# Patient Record
Sex: FEMALE | Race: ASIAN | NOT HISPANIC OR LATINO | URBAN - METROPOLITAN AREA
[De-identification: names, ages, dates, MRNs, and addresses within clinical notes are randomized per-mention and may not be internally consistent; named-entity substitution may affect disease eponyms.]

---

## 2024-05-02 ENCOUNTER — INPATIENT (INPATIENT)
Facility: HOSPITAL | Age: 24
LOS: 7 days | Discharge: HOME | End: 2024-05-10
Attending: STUDENT IN AN ORGANIZED HEALTH CARE EDUCATION/TRAINING PROGRAM | Admitting: STUDENT IN AN ORGANIZED HEALTH CARE EDUCATION/TRAINING PROGRAM
Payer: COMMERCIAL

## 2024-05-02 VITALS
OXYGEN SATURATION: 100 % | TEMPERATURE: 98 F | SYSTOLIC BLOOD PRESSURE: 120 MMHG | RESPIRATION RATE: 18 BRPM | DIASTOLIC BLOOD PRESSURE: 83 MMHG | HEART RATE: 65 BPM

## 2024-05-02 DIAGNOSIS — F32.A DEPRESSION, UNSPECIFIED: ICD-10-CM

## 2024-05-02 DIAGNOSIS — F41.9 ANXIETY DISORDER, UNSPECIFIED: ICD-10-CM

## 2024-05-02 DIAGNOSIS — T14.91XA SUICIDE ATTEMPT, INITIAL ENCOUNTER: ICD-10-CM

## 2024-05-02 LAB
ALBUMIN SERPL ELPH-MCNC: 4.7 G/DL — SIGNIFICANT CHANGE UP (ref 3.3–5)
ALP SERPL-CCNC: 55 U/L — SIGNIFICANT CHANGE UP (ref 40–120)
ALT FLD-CCNC: 9 U/L — SIGNIFICANT CHANGE UP (ref 4–33)
AMPHET UR-MCNC: NEGATIVE — SIGNIFICANT CHANGE UP
ANION GAP SERPL CALC-SCNC: 15 MMOL/L — HIGH (ref 7–14)
APAP SERPL-MCNC: <10 UG/ML — LOW (ref 15–25)
APPEARANCE UR: CLEAR — SIGNIFICANT CHANGE UP
AST SERPL-CCNC: 11 U/L — SIGNIFICANT CHANGE UP (ref 4–32)
BARBITURATES UR SCN-MCNC: NEGATIVE — SIGNIFICANT CHANGE UP
BASOPHILS # BLD AUTO: 0.04 K/UL — SIGNIFICANT CHANGE UP (ref 0–0.2)
BASOPHILS NFR BLD AUTO: 0.6 % — SIGNIFICANT CHANGE UP (ref 0–2)
BENZODIAZ UR-MCNC: NEGATIVE — SIGNIFICANT CHANGE UP
BILIRUB SERPL-MCNC: 0.6 MG/DL — SIGNIFICANT CHANGE UP (ref 0.2–1.2)
BILIRUB UR-MCNC: NEGATIVE — SIGNIFICANT CHANGE UP
BUN SERPL-MCNC: 8 MG/DL — SIGNIFICANT CHANGE UP (ref 7–23)
CALCIUM SERPL-MCNC: 9 MG/DL — SIGNIFICANT CHANGE UP (ref 8.4–10.5)
CHLORIDE SERPL-SCNC: 104 MMOL/L — SIGNIFICANT CHANGE UP (ref 98–107)
CO2 SERPL-SCNC: 22 MMOL/L — SIGNIFICANT CHANGE UP (ref 22–31)
COCAINE METAB.OTHER UR-MCNC: NEGATIVE — SIGNIFICANT CHANGE UP
COLOR SPEC: YELLOW — SIGNIFICANT CHANGE UP
CREAT SERPL-MCNC: 0.5 MG/DL — SIGNIFICANT CHANGE UP (ref 0.5–1.3)
CREATININE URINE RESULT, DAU: 17 MG/DL — SIGNIFICANT CHANGE UP
DIFF PNL FLD: NEGATIVE — SIGNIFICANT CHANGE UP
EGFR: 136 ML/MIN/1.73M2 — SIGNIFICANT CHANGE UP
EOSINOPHIL # BLD AUTO: 0.06 K/UL — SIGNIFICANT CHANGE UP (ref 0–0.5)
EOSINOPHIL NFR BLD AUTO: 0.9 % — SIGNIFICANT CHANGE UP (ref 0–6)
ETHANOL SERPL-MCNC: <10 MG/DL — SIGNIFICANT CHANGE UP
GLUCOSE SERPL-MCNC: 77 MG/DL — SIGNIFICANT CHANGE UP (ref 70–99)
GLUCOSE UR QL: NEGATIVE MG/DL — SIGNIFICANT CHANGE UP
HCG SERPL-ACNC: <1 MIU/ML — SIGNIFICANT CHANGE UP
HCT VFR BLD CALC: 43.7 % — SIGNIFICANT CHANGE UP (ref 34.5–45)
HGB BLD-MCNC: 14.2 G/DL — SIGNIFICANT CHANGE UP (ref 11.5–15.5)
IANC: 4.49 K/UL — SIGNIFICANT CHANGE UP (ref 1.8–7.4)
IMM GRANULOCYTES NFR BLD AUTO: 0.3 % — SIGNIFICANT CHANGE UP (ref 0–0.9)
KETONES UR-MCNC: ABNORMAL MG/DL
LEUKOCYTE ESTERASE UR-ACNC: NEGATIVE — SIGNIFICANT CHANGE UP
LYMPHOCYTES # BLD AUTO: 1.8 K/UL — SIGNIFICANT CHANGE UP (ref 1–3.3)
LYMPHOCYTES # BLD AUTO: 26.2 % — SIGNIFICANT CHANGE UP (ref 13–44)
MCHC RBC-ENTMCNC: 30.1 PG — SIGNIFICANT CHANGE UP (ref 27–34)
MCHC RBC-ENTMCNC: 32.5 GM/DL — SIGNIFICANT CHANGE UP (ref 32–36)
MCV RBC AUTO: 92.8 FL — SIGNIFICANT CHANGE UP (ref 80–100)
METHADONE UR-MCNC: NEGATIVE — SIGNIFICANT CHANGE UP
MONOCYTES # BLD AUTO: 0.47 K/UL — SIGNIFICANT CHANGE UP (ref 0–0.9)
MONOCYTES NFR BLD AUTO: 6.8 % — SIGNIFICANT CHANGE UP (ref 2–14)
NEUTROPHILS # BLD AUTO: 4.49 K/UL — SIGNIFICANT CHANGE UP (ref 1.8–7.4)
NEUTROPHILS NFR BLD AUTO: 65.2 % — SIGNIFICANT CHANGE UP (ref 43–77)
NITRITE UR-MCNC: NEGATIVE — SIGNIFICANT CHANGE UP
NRBC # BLD: 0 /100 WBCS — SIGNIFICANT CHANGE UP (ref 0–0)
NRBC # FLD: 0 K/UL — SIGNIFICANT CHANGE UP (ref 0–0)
OPIATES UR-MCNC: NEGATIVE — SIGNIFICANT CHANGE UP
OXYCODONE UR-MCNC: NEGATIVE — SIGNIFICANT CHANGE UP
PCP SPEC-MCNC: SIGNIFICANT CHANGE UP
PCP UR-MCNC: NEGATIVE — SIGNIFICANT CHANGE UP
PH UR: 6.5 — SIGNIFICANT CHANGE UP (ref 5–8)
PLATELET # BLD AUTO: 335 K/UL — SIGNIFICANT CHANGE UP (ref 150–400)
POTASSIUM SERPL-MCNC: 3.9 MMOL/L — SIGNIFICANT CHANGE UP (ref 3.5–5.3)
POTASSIUM SERPL-SCNC: 3.9 MMOL/L — SIGNIFICANT CHANGE UP (ref 3.5–5.3)
PROT SERPL-MCNC: 8 G/DL — SIGNIFICANT CHANGE UP (ref 6–8.3)
PROT UR-MCNC: NEGATIVE MG/DL — SIGNIFICANT CHANGE UP
RBC # BLD: 4.71 M/UL — SIGNIFICANT CHANGE UP (ref 3.8–5.2)
RBC # FLD: 12.2 % — SIGNIFICANT CHANGE UP (ref 10.3–14.5)
SALICYLATES SERPL-MCNC: <0.3 MG/DL — LOW (ref 15–30)
SARS-COV-2 RNA SPEC QL NAA+PROBE: SIGNIFICANT CHANGE UP
SODIUM SERPL-SCNC: 141 MMOL/L — SIGNIFICANT CHANGE UP (ref 135–145)
SP GR SPEC: 1 — SIGNIFICANT CHANGE UP (ref 1–1.03)
THC UR QL: NEGATIVE — SIGNIFICANT CHANGE UP
TOXICOLOGY SCREEN, DRUGS OF ABUSE, SERUM RESULT: SIGNIFICANT CHANGE UP
TSH SERPL-MCNC: 1.02 UIU/ML — SIGNIFICANT CHANGE UP (ref 0.27–4.2)
UROBILINOGEN FLD QL: 0.2 MG/DL — SIGNIFICANT CHANGE UP (ref 0.2–1)
WBC # BLD: 6.88 K/UL — SIGNIFICANT CHANGE UP (ref 3.8–10.5)
WBC # FLD AUTO: 6.88 K/UL — SIGNIFICANT CHANGE UP (ref 3.8–10.5)

## 2024-05-02 PROCEDURE — 99285 EMERGENCY DEPT VISIT HI MDM: CPT

## 2024-05-02 RX ORDER — HYDROXYZINE HCL 10 MG
25 TABLET ORAL EVERY 6 HOURS
Refills: 0 | Status: DISCONTINUED | OUTPATIENT
Start: 2024-05-02 | End: 2024-05-10

## 2024-05-02 RX ADMIN — Medication 1 MILLIGRAM(S): at 21:06

## 2024-05-02 RX ADMIN — Medication 25 MILLIGRAM(S): at 21:06
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
## 2024-05-02 NOTE — ED BEHAVIORAL HEALTH ASSESSMENT NOTE - NSSUICPROTFACT_PSY_ALL_CORE
Responsibility to children, family, or others/Identifies reasons for living/Engaged in work or school/Taoism beliefs

## 2024-05-02 NOTE — BH PATIENT PROFILE - NSICDXPASTMEDICALHX_GEN_ALL_CORE_FT
Pt ambulated into er with grandson. Pt reports she accidentally stepped on her cat last night and got bit on her right lower leg/ankle. Pt reports today the area is more red and swollen.   PAST MEDICAL HISTORY:  No pertinent past medical history
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
## 2024-05-02 NOTE — ED ADULT NURSE NOTE - OBJECTIVE STATEMENT
Received patient to [facility] from [facility] after she was brought from a therapist office. Patient told them that she was suicidal. Patient also states her family is her trigger/problem, She has attempted suicide  in the past.  Last week she tried to hang herself but the rope was not strong enough. She has been depressed for two months and was suicidal  last week. Patient evaluated by medical provider and labs drawn and sent. Patient being evaluated by psychiatrist at the present time. Waiting for results and disposition.  JIA Villareal

## 2024-05-02 NOTE — ED PROVIDER NOTE - CPE EDP NEURO NORM
----- Message from Abiola Nelson NP sent at 1/21/2022 12:04 PM CST -----  Please call patient with results.  Normal CBC and thyroid level.     Normal blood sugar, kidney function, and liver panel. Rest of CMP okay.     Cholesterol, LDL and triglycerides higher, HDL lower compared to eight months ago Overall cholesterol higher and his and his 10-year risk of heart disease or stroke is 8.5%. On the basis of your age and calculated risk for heart disease or stroke over 7.5%, the ACC/AHA guidelines suggest you should be on a moderate to high intensity statin.  REC: Continue with healthy diet (avoiding fats, carbohydrates and sugars) and exercising as tolerated. See if patient willing to start on statin Atorvastatin 10 mg daily. Recheck FLP/CMP if patient is agreeable. If only continues diet, weight loss, and exercise may recheck FLP in 4-6 months otherwise.     Otherwise continue with present management   normal...

## 2024-05-02 NOTE — ED ADULT TRIAGE NOTE - CHIEF COMPLAINT QUOTE
Brought in from outpatient mental health center after telling them she attempted to take her own life by hanging last week. Patient denies HI/VI/AH or current SI. Calm and cooperative in triage. Denies SOB, throat/neck pain, any other discomfort. No visible katarzyna on neck. PA Allison called. Patient brought back to .
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
## 2024-05-02 NOTE — ED BEHAVIORAL HEALTH ASSESSMENT NOTE - NS ED BHA PLAN ADMIT TO PSYCHIATRY BH CONTACTED FT
attempted to update referring [facility] staff at [phone]:  answering service instructed provider to call back tomorrow for an update as "office is close"
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
## 2024-05-02 NOTE — ED PROVIDER NOTE - CLINICAL SUMMARY MEDICAL DECISION MAKING FREE TEXT BOX
22 y/o F with no pmh states depression for the past 2 weeks accompanied with suicide ideation for the past 2 weeks with an attempt to hang herself last week but unable to due to an issue with the rope. patient made herself an appt at the [facility] who sent her here via EMS for evaluation. Patient denies prior suicide attempts. States her stress factors and depression stem from issues with her family. Has a job and works daily in a restaurant. Denies f/c, headache, dizziness, visual changes, CP, SOB, abdominal pain, n/v/d, dysuria, weakness, numbness, tingling. Denies visual/auditory hallucinations. Denies drug use/ETOH. Admits to vaping.   Plan: Will draw psych labs, UA, EKG, tox screen, COVID, Psych Consult, medicate as needed,  admission
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
## 2024-05-02 NOTE — BH PATIENT PROFILE - STATED REASON FOR ADMISSION
"I just wanted to do a therapy but they sent me to [facility] because I told them I was being suicidal"
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
## 2024-05-02 NOTE — ED BEHAVIORAL HEALTH ASSESSMENT NOTE - PSYCHIATRIC ISSUES AND PLAN (INCLUDE STANDING AND PRN MEDICATION)
defer standing psychotropic to defer standing psychotropic to PRIMARY treatment team. PRNs: atarax 25mg PO q6Hrs for anxiety/ sleep disturbances.  PRNs: ativan 1mg PO/ ativan 2mg IM q6hrs for agitation (PO)/ severe agitation (IM)
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
## 2024-05-02 NOTE — ED BEHAVIORAL HEALTH NOTE - BEHAVIORAL HEALTH NOTE
[facility]  Reference #: [id number] - NO CONTROLLED SUBSTANCES PRESCRIBED    per [facility] UNIFIED COURT SYSTEM/ WEBCRIMS SITE: NO PENDING LEGAL CASES    per PSYCKES: NO YIELD    per CVM: no past/ recent OPD attendances [facility]  Reference #: [id number] - NO CONTROLLED SUBSTANCES PRESCRIBED    per [facility] UNIFIED COURT SYSTEM/ WEBCRIMS SITE: NO PENDING LEGAL CASES    per PSYCKES: NO YIELD    per CVM: no past/ recent OPD attendances      [place] SAFE Act Reporting  9.46 [facility] Mental Hygiene Law  Submitted On: 5/2/2024 7:08:59 PM  Reference Number: [id number]  By: Nelson Vines  For: Jelena Baxter
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
## 2024-05-02 NOTE — ED BEHAVIORAL HEALTH ASSESSMENT NOTE - OTHER PAST PSYCHIATRIC HISTORY (INCLUDE DETAILS REGARDING ONSET, COURSE OF ILLNESS, INPATIENT/OUTPATIENT TREATMENT)
no established psychiatric hx  no reported hx of in Pt psych admissions  denied any hx of SA (except last sunday wherein she attempted to self strangulate) nor engaged in any NSSIB  not receiving psych care except for 1st time encounter today with a therapist (from [facility], [place], [place])

## 2024-05-02 NOTE — ED PROVIDER NOTE - PROGRESS NOTE DETAILS
ASHLEY Cohen- seen by Psych. recommends admission L4. Labs and EKG stable. medically cleared for admission
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
## 2024-05-02 NOTE — ED BEHAVIORAL HEALTH ASSESSMENT NOTE - SUICIDAL BEHAVIOR DETAILS
attempted suicide last sunday; then continued to harbor intermittent/ passive SI. researched on other means to commit suicide (by googling gun stores within her area - with plan to buy gun and shoot self OR buy tylenol then overdose with it) attempted suicide last Sunday; then continued to harbor intermittent/ passive SI. researched on other means to commit suicide (by googling gun stores within her area - with plan to buy gun and shoot self OR buy tylenol then overdose with it)

## 2024-05-02 NOTE — ED BEHAVIORAL HEALTH ASSESSMENT NOTE - VETERAN
Patient is a 88y old  Female who presents with a chief complaint of COPD Exacerbation (21 Jun 2022 14:14)      HPI:  88 year old female with PMHx COPD, HTN, HLD, T2DM on insulin, hx of MI presents from assisted living facility with dyspnea, wheezing, labored breathing. Patient recently admitted to [facility] 3/9-15/2022 for COPD exacerbation. History obtained from son Calvin, as patient is limited historian at baseline. Son states he received a call from [facility] this AM stating patient is having labored breathing. Patient was sent to [facility] for further management.     ED course:  Vitals: HR 91 SpO2 on BIPAP  Labs: WBC 12.64, BUN 31/1.5, Glu 188, ProBNP 1430  Given Mag Sulfate IVPB 1 g, Solumedrol 125 mg IVP x1 (13 Jun 2022 06:00)      Asked to see patient for ID Consult    PAST MEDICAL & SURGICAL HISTORY:  Uncomplicated asthma, unspecified asthma severity      DM2 (diabetes mellitus, type 2)      Essential hypertension      Hypothyroidism, unspecified type      Pure hypercholesterolemia      Gastroesophageal reflux disease without esophagitis      Diabetes      Asthma      CAD (coronary artery disease)      HTN (hypertension)      HLD (hyperlipidemia)      Hypothyroid      NSTEMI (non-ST elevated myocardial infarction)      History of appendectomy      History of appendectomy      Abnormal findings on cardiac catheterization  Cardiac Cath          Allergies    doxycycline (Unknown)  iodine (Hives)  iodine containing compounds (Unknown)  shellfish (Anaphylaxis)  shellfish (Swelling; Short breath)    Intolerances        REVIEW OF SYSTEMS:  All systems below were reviewed and are negative [  ]  HEENT:  ID:  Pulmonary:  Cardiac:  GI:  Renal:  Musculoskeletal:  All other systems above were reviewed and are negative   [  ]    HOME MEDICATIONS:    MEDICATIONS  (STANDING):  albuterol/ipratropium for Nebulization 3 milliLiter(s) Nebulizer every 6 hours  artificial tears (preservative free) Ophthalmic Solution 1 Drop(s) Both EYES two times a day  aspirin  chewable 81 milliGRAM(s) Oral daily  atorvastatin 80 milliGRAM(s) Oral at bedtime  cholecalciferol 2000 Unit(s) Oral daily  clopidogrel Tablet 75 milliGRAM(s) Oral daily  dextrose 5%. 1000 milliLiter(s) (50 mL/Hr) IV Continuous <Continuous>  dextrose 5%. 1000 milliLiter(s) (100 mL/Hr) IV Continuous <Continuous>  dextrose 50% Injectable 25 Gram(s) IV Push once  dextrose 50% Injectable 12.5 Gram(s) IV Push once  dextrose 50% Injectable 25 Gram(s) IV Push once  dextrose 50% Injectable 25 Gram(s) IV Push once  escitalopram 10 milliGRAM(s) Oral daily  gabapentin 100 milliGRAM(s) Oral three times a day  glucagon  Injectable 1 milliGRAM(s) IntraMuscular once  glucagon  Injectable 1 milliGRAM(s) IntraMuscular once  heparin   Injectable 5000 Unit(s) SubCutaneous every 12 hours  insulin glargine Injectable (LANTUS) 15 Unit(s) SubCutaneous every morning  insulin lispro (ADMELOG) corrective regimen sliding scale   SubCutaneous three times a day before meals  insulin lispro (ADMELOG) corrective regimen sliding scale   SubCutaneous at bedtime  insulin lispro Injectable (ADMELOG) 8 Unit(s) SubCutaneous three times a day before meals  isosorbide   mononitrate ER Tablet (IMDUR) 30 milliGRAM(s) Oral daily  levothyroxine 125 MICROGram(s) Oral daily  metoprolol succinate ER 50 milliGRAM(s) Oral daily    MEDICATIONS  (PRN):  acetaminophen     Tablet .. 650 milliGRAM(s) Oral every 6 hours PRN Mild Pain (1 - 3)  ALBUTerol    0.083% 2.5 milliGRAM(s) Nebulizer every 4 hours PRN Shortness of Breath and/or Wheezing  ALPRAZolam 0.5 milliGRAM(s) Oral two times a day PRN Anxiety  dextrose Oral Gel 15 Gram(s) Oral once PRN Blood Glucose LESS THAN 70 milliGRAM(s)/deciliter  melatonin 3 milliGRAM(s) Oral at bedtime PRN Insomnia      Vital Signs Last 24 Hrs  T(C): 36.6 (21 Jun 2022 13:33), Max: 36.6 (21 Jun 2022 13:33)  T(F): 97.9 (21 Jun 2022 13:33), Max: 97.9 (21 Jun 2022 13:33)  HR: 67 (21 Jun 2022 13:33) (64 - 69)  BP: 128/82 (21 Jun 2022 13:33) (128/82 - 146/78)  BP(mean): --  RR: 18 (21 Jun 2022 13:33) (18 - 19)  SpO2: 93% (21 Jun 2022 13:33) (93% - 98%)    PHYSICAL EXAM:  HEENT: NC/AT  Neck: Soft  Lungs: Decreased BS bilaterally; no wheezing.   Heart: RRR, no murmurs.   Abdomen: Soft, no tenderness. No masses.   Genital/ Rectal: No torres catheter   Extremities: No ulcers.   Neurologic: Confused.     I&O's Summary      LABORATORY:                          13.1   19.58 )-----------( 238      ( 21 Jun 2022 07:56 )             42.5       ESR:                   06-13 @ 15:38  --    C-Reactive Protein:     06-13 @ 15:38  21    Procalcitonin:           06-13 @ 15:38   --      06-21    141  |  107  |  75<H>  ----------------------------<  129<H>  4.4   |  23  |  1.50<H>    Ca    8.9      21 Jun 2022 07:56    TPro  7.0  /  Alb  2.9<L>  /  TBili  0.6  /  DBili  x   /  AST  19  /  ALT  21  /  AlkPhos  70  06-20      Rapid Respiratory Viral Panel Result        06-13 @ 07:46  Rapid RVP Detected  Coronovirus --  Adenovirus --  Bordetella Pertussis --  Chlamydia Pneumonia --  Entero/Rhinovirus--  HKU1 Coronovirus --  HMPV Coronovirus Detected  Influenza A --  Influenza AH1 --  Influenza AH1 2009 --  Influenza AH3 --  Influenza B --  Mycoplasma Pneumoniae --  NL63 Coronovirus --  OC43 Coronovirus --  Parainfluenza 1 --  Parainfluenza 2 --  Parainfluenza 3 --  Parainfluenza 4 --  Resp Syncytial Virus --      LABORATORY:    CBC Full  -  ( 21 Jun 2022 07:56 )  WBC Count : 19.58 K/uL  RBC Count : 4.74 M/uL  Hemoglobin : 13.1 g/dL  Hematocrit : 42.5 %  Platelet Count - Automated : 238 K/uL  Mean Cell Volume : 89.7 fl  Mean Cell Hemoglobin : 27.6 pg  Mean Cell Hemoglobin Concentration : 30.8 gm/dL  Auto Neutrophil # : 15.60 K/uL  Auto Lymphocyte # : 2.34 K/uL  Auto Monocyte # : 1.17 K/uL  Auto Eosinophil # : 0.25 K/uL  Auto Basophil # : 0.03 K/uL  Auto Neutrophil % : 79.5 %  Auto Lymphocyte % : 12.0 %  Auto Monocyte % : 6.0 %  Auto Eosinophil % : 1.3 %  Auto Basophil % : 0.2 %          06-21    141  |  107  |  75<H>  ----------------------------<  129<H>  4.4   |  23  |  1.50<H>    Ca    8.9      21 Jun 2022 07:56    TPro  7.0  /  Alb  2.9<L>  /  TBili  0.6  /  DBili  x   /  AST  19  /  ALT  21  /  AlkPhos  70  06-20        Assessment and plan:    1. HMV   2. COPD exacerbation.  3. Leukocytosis.    Get UA, Procalcitonin  Supportive care    Thank you.                                            Patient is a 88y old  Female who presents with a chief complaint of COPD Exacerbation (21 Jun 2022 14:14)      The patient is an 88 year old female with a PMHx COPD, HTN, HLD, T2DM, CAD and MI who was brought to the ED  from assisted living facility 4 days agio with dyspnea, wheezing and labored breathing. In the ED the patient was in respiratory distress and was placed on BIBAP. She was admitted for COPD exacerbation and was treated with IV Solumedrol and Duo neb. Her SOB has improved and she is on room air today. Steroid was tapered and discontinued by pulmonary 2 days ago. The patient was noted t have increasing leukocytosis in the last few days with WBC of 19K today. She has been afebrile and no cough or wheezing or nausea or vomiting noted today. She has not been treated with antibiotics. She was seen by SLP today for dysphagia and was placed on puree diet with thickened liquids. Her RVP was positive for human meta pneumovirus.         PAST MEDICAL & SURGICAL HISTORY:  Uncomplicated asthma, unspecified asthma severity      DM2 (diabetes mellitus, type 2)      Essential hypertension      Hypothyroidism, unspecified type      Pure hypercholesterolemia      Gastroesophageal reflux disease without esophagitis      Diabetes      Asthma      CAD (coronary artery disease)      HTN (hypertension)      HLD (hyperlipidemia)      Hypothyroid      NSTEMI (non-ST elevated myocardial infarction)      History of appendectomy      History of appendectomy      Abnormal findings on cardiac catheterization  Cardiac Cath          Allergies    doxycycline (Unknown)  iodine (Hives)  iodine containing compounds (Unknown)  shellfish (Anaphylaxis)  shellfish (Swelling; Short breath)    Intolerances        REVIEW OF SYSTEMS:  No vomiting or diarrhea.  No chest pain.      HOME MEDICATIONS:    MEDICATIONS  (STANDING):  albuterol/ipratropium for Nebulization 3 milliLiter(s) Nebulizer every 6 hours  artificial tears (preservative free) Ophthalmic Solution 1 Drop(s) Both EYES two times a day  aspirin  chewable 81 milliGRAM(s) Oral daily  atorvastatin 80 milliGRAM(s) Oral at bedtime  cholecalciferol 2000 Unit(s) Oral daily  clopidogrel Tablet 75 milliGRAM(s) Oral daily  dextrose 5%. 1000 milliLiter(s) (50 mL/Hr) IV Continuous <Continuous>  dextrose 5%. 1000 milliLiter(s) (100 mL/Hr) IV Continuous <Continuous>  dextrose 50% Injectable 25 Gram(s) IV Push once  dextrose 50% Injectable 12.5 Gram(s) IV Push once  dextrose 50% Injectable 25 Gram(s) IV Push once  dextrose 50% Injectable 25 Gram(s) IV Push once  escitalopram 10 milliGRAM(s) Oral daily  gabapentin 100 milliGRAM(s) Oral three times a day  glucagon  Injectable 1 milliGRAM(s) IntraMuscular once  glucagon  Injectable 1 milliGRAM(s) IntraMuscular once  heparin   Injectable 5000 Unit(s) SubCutaneous every 12 hours  insulin glargine Injectable (LANTUS) 15 Unit(s) SubCutaneous every morning  insulin lispro (ADMELOG) corrective regimen sliding scale   SubCutaneous three times a day before meals  insulin lispro (ADMELOG) corrective regimen sliding scale   SubCutaneous at bedtime  insulin lispro Injectable (ADMELOG) 8 Unit(s) SubCutaneous three times a day before meals  isosorbide   mononitrate ER Tablet (IMDUR) 30 milliGRAM(s) Oral daily  levothyroxine 125 MICROGram(s) Oral daily  metoprolol succinate ER 50 milliGRAM(s) Oral daily    MEDICATIONS  (PRN):  acetaminophen     Tablet .. 650 milliGRAM(s) Oral every 6 hours PRN Mild Pain (1 - 3)  ALBUTerol    0.083% 2.5 milliGRAM(s) Nebulizer every 4 hours PRN Shortness of Breath and/or Wheezing  ALPRAZolam 0.5 milliGRAM(s) Oral two times a day PRN Anxiety  dextrose Oral Gel 15 Gram(s) Oral once PRN Blood Glucose LESS THAN 70 milliGRAM(s)/deciliter  melatonin 3 milliGRAM(s) Oral at bedtime PRN Insomnia      Vital Signs Last 24 Hrs  T(C): 36.6 (21 Jun 2022 13:33), Max: 36.6 (21 Jun 2022 13:33)  T(F): 97.9 (21 Jun 2022 13:33), Max: 97.9 (21 Jun 2022 13:33)  HR: 67 (21 Jun 2022 13:33) (64 - 69)  BP: 128/82 (21 Jun 2022 13:33) (128/82 - 146/78)  BP(mean): --  RR: 18 (21 Jun 2022 13:33) (18 - 19)  SpO2: 93% (21 Jun 2022 13:33) (93% - 98%)    PHYSICAL EXAM:  HEENT: NC/AT, PERRLA.  Neck: Soft, no masses,.   Lungs: Decreased BS bilaterally; no wheezing.   Heart: RRR, no murmurs.   Abdomen: Soft, no tenderness. No masses.   Genital/ Rectal: No torres catheter   Extremities: No ulcers.   Neurologic: Confused.     I&O's Summary      LABORATORY:                          13.1   19.58 )-----------( 238      ( 21 Jun 2022 07:56 )             42.5       ESR:                   06-13 @ 15:38  --    C-Reactive Protein:     06-13 @ 15:38  21    Procalcitonin:           06-13 @ 15:38   --      06-21    141  |  107  |  75<H>  ----------------------------<  129<H>  4.4   |  23  |  1.50<H>    Ca    8.9      21 Jun 2022 07:56    TPro  7.0  /  Alb  2.9<L>  /  TBili  0.6  /  DBili  x   /  AST  19  /  ALT  21  /  AlkPhos  70  06-20      Rapid Respiratory Viral Panel Result        06-13 @ 07:46  Rapid RVP Detected  Coronovirus --  Adenovirus --  Bordetella Pertussis --  Chlamydia Pneumonia --  Entero/Rhinovirus--  HKU1 Coronovirus --  HMPV Coronovirus Detected  Influenza A --  Influenza AH1 --  Influenza AH1 2009 --  Influenza AH3 --  Influenza B --  Mycoplasma Pneumoniae --  NL63 Coronovirus --  OC43 Coronovirus --  Parainfluenza 1 --  Parainfluenza 2 --  Parainfluenza 3 --  Parainfluenza 4 --  Resp Syncytial Virus --      LABORATORY:    CBC Full  -  ( 21 Jun 2022 07:56 )  WBC Count : 19.58 K/uL  RBC Count : 4.74 M/uL  Hemoglobin : 13.1 g/dL  Hematocrit : 42.5 %  Platelet Count - Automated : 238 K/uL  Mean Cell Volume : 89.7 fl  Mean Cell Hemoglobin : 27.6 pg  Mean Cell Hemoglobin Concentration : 30.8 gm/dL  Auto Neutrophil # : 15.60 K/uL  Auto Lymphocyte # : 2.34 K/uL  Auto Monocyte # : 1.17 K/uL  Auto Eosinophil # : 0.25 K/uL  Auto Basophil # : 0.03 K/uL  Auto Neutrophil % : 79.5 %  Auto Lymphocyte % : 12.0 %  Auto Monocyte % : 6.0 %  Auto Eosinophil % : 1.3 %  Auto Basophil % : 0.2 %          06-21    141  |  107  |  75<H>  ----------------------------<  129<H>  4.4   |  23  |  1.50<H>    Ca    8.9      21 Jun 2022 07:56    TPro  7.0  /  Alb  2.9<L>  /  TBili  0.6  /  DBili  x   /  AST  19  /  ALT  21  /  AlkPhos  70  06-20        Assessment and plan:    1. COPD exacerbation due to viral infection with human meta pneumovirus.   2. Leukocytosis.  3. Dysphagia.    . Increasing leukocytosis is likely due to recent doses of IV and po steroids. Low suspicion for pneumonia or other infections as the patient SOB has improved and she has no cough or fevers now.  . Get UA and procalcitonin.  . Monitor the patient off antibiotics,  . Supportive care.    Get UA, Procalcitonin  Supportive care    Thank you.                                            No

## 2024-05-02 NOTE — ED PROVIDER NOTE - OBJECTIVE STATEMENT
24 y/o F with no pmh states depression for the past 2 weeks accompanied with suicide ideation for the past 2 weeks with an attempt to hang herself last week but unable to due to an issue with the rope. patient made herself an appt at the [facility] who sent her here via EMS for evaluation. Patient denies prior suicide attempts. States her stress factors and depression stem from issues with her family. Has a job and works daily in a restaurant. Denies f/c, headache, dizziness, visual changes, CP, SOB, abdominal pain, n/v/d, dysuria, weakness, numbness, tingling. Denies visual/auditory hallucinations. Denies drug use/ETOH. Admits to vaping.

## 2024-05-03 LAB
A1C WITH ESTIMATED AVERAGE GLUCOSE RESULT: 5.3 % — SIGNIFICANT CHANGE UP (ref 4–5.6)
CHOLEST SERPL-MCNC: 184 MG/DL — SIGNIFICANT CHANGE UP
ESTIMATED AVERAGE GLUCOSE: 105 — SIGNIFICANT CHANGE UP
HDLC SERPL-MCNC: 64 MG/DL — SIGNIFICANT CHANGE UP
LIPID PNL WITH DIRECT LDL SERPL: 111 MG/DL — HIGH
NON HDL CHOLESTEROL: 120 MG/DL — SIGNIFICANT CHANGE UP
TRIGL SERPL-MCNC: 43 MG/DL — SIGNIFICANT CHANGE UP

## 2024-05-03 PROCEDURE — 99222 1ST HOSP IP/OBS MODERATE 55: CPT

## 2024-05-03 NOTE — BH INPATIENT PSYCHIATRY ASSESSMENT NOTE - NSBHLEGALSTATUS_PSY_ALL_CORE
Patient may use melatonin
Pt states she's not sleeping - leg cramps and feet cramps;slept 1 hour last night and 2 hours the night before; melatonin - pt states it makes her groggy.     picked up MidNight - didn't ask the pharmacist at [facility] - KEITH- hasn't taken it yet and wants to know if it's okay to take    Please call and advise
9.13 (Voluntary)

## 2024-05-03 NOTE — BH INPATIENT PSYCHIATRY ASSESSMENT NOTE - NSSUICPROTFACT_PSY_ALL_CORE
Responsibility to children, family, or others/Identifies reasons for living/Engaged in work or school/Tenriism beliefs

## 2024-05-03 NOTE — BH INPATIENT PSYCHIATRY ASSESSMENT NOTE - NSBHASSESSSUMMFT_PSY_ALL_CORE
Patient is a  23 year old single female, no dependents.  Patient domiciles in private residence with family, currently employed.  Patient has no PPH. Patient has no prior inpatient hospitalizations.  Patient BIB EMS as a referral from [facility] for evaluation of depression + anxiety + attempted suicide (over the weekend). Patient is now hospitalized with a primary problem of emotional decompensation and SI (she had planned to buy  a gun then shooting self or possibility of overdosing on pills tylenol). However patient placed a rope around her neck then strangulated self (for about 10 seconds until the rope snapped/ broke).  Patient admitted to [facility] on a 9.13 legal status. Patient requires inpatient hospitalization due to symptoms of mental illness so severe that they significantly interfere with activities of daily living, and presents a potential danger to self as a result of acute emotional decompensation with s/p SA,  recent episode of intentional self-injury. She is requiring 24-hour care at this time as a result, for psychiatric stabilization and safety.    Plan:  >Legal: 9.13  >Obs: Routine, no current SI. no need for CO, patient not expected to pose risk to self or others in controlled inpatient setting  >Psychiatric Meds:   -  PRN medications:  Ativan 2mg oral Q6HR PRN for agitation and anxiety.  Haldol 5mg oral Q6HR PRN for agitation.   Benadryl 50mg oral Q6HR PRN for agitation.   >Labs: Admission labs reviewed, no acute findings. U-tox negative.  Labs pending for today: A1c and Lipid panel. Hold antipsychotics if QTc >500  >Medical:  No acute concerns. No consultations needed at this time. No indication for CIWA. Patient with consistently stable VS, no visible physical symptoms of withdrawal.   During the course of treatment, will collaborate with medical team to manage medical issues.  >Diet: Regular  >Social: milieu/structured therapy  >Treatment Interventions: Groups and Individual Therapy/CBT, Motivational counseling for substance abuse related issues.   >Dispo: Collateral and dispo planning pending further symptom and medication optimization     Patient is a  23 year old single female, no dependents.  Patient domiciles in private residence with family, currently employed.  Patient has no PPH. Patient has no prior inpatient hospitalizations.  Patient BIB EMS as a referral from [facility] for evaluation of depression + anxiety + attempted suicide (over the weekend). Patient is now hospitalized with a primary problem of emotional decompensation and SI (she had planned to buy  a gun then shooting self or possibility of overdosing on pills tylenol). However patient placed a rope around her neck then strangulated self (for about 10 seconds until the rope snapped/ broke).  Patient admitted to [facility] on a 9.13 legal status. Patient requires inpatient hospitalization due to symptoms of mental illness so severe that they significantly interfere with activities of daily living, and presents a potential danger to self as a result of acute emotional decompensation with s/p SA,  recent episode of intentional self-injury. She is requiring 24-hour care at this time as a result, for psychiatric stabilization and safety.    Plan:  >Legal: 9.13  >Obs: Routine, no current SI. no need for CO, patient not expected to pose risk to self or others in controlled inpatient setting  >Psychiatric Meds:   -pt declined medications at this time  PRN medications:  Ativan 2mg oral Q6HR PRN for agitation and anxiety.  Haldol 5mg oral Q6HR PRN for agitation.   Benadryl 50mg oral Q6HR PRN for agitation.   >Labs: Admission labs reviewed, no acute findings. U-tox negative.  Labs pending for today: A1c and Lipid panel. Hold antipsychotics if QTc >500  >Medical:  No acute concerns. No consultations needed at this time. No indication for CIWA. Patient with consistently stable VS, no visible physical symptoms of withdrawal.   During the course of treatment, will collaborate with medical team to manage medical issues.  >Diet: Regular  >Social: milieu/structured therapy  >Treatment Interventions: Groups and Individual Therapy/CBT, Motivational counseling for substance abuse related issues.   >Dispo: Collateral and dispo planning pending further symptom and medication optimization

## 2024-05-03 NOTE — BH INPATIENT PSYCHIATRY ASSESSMENT NOTE - OTHER PAST PSYCHIATRIC HISTORY (INCLUDE DETAILS REGARDING ONSET, COURSE OF ILLNESS, INPATIENT/OUTPATIENT TREATMENT)
no established psychiatric hx  no reported hx of in Pt psych admissions  denied any hx of SA (except last sunday wherein she attempted to self strangulate) nor engaged in any NSSIB  not receiving psych care except for 1st time encounter today with a therapist (from [facility], [place], [place])

## 2024-05-03 NOTE — BH INPATIENT PSYCHIATRY ASSESSMENT NOTE - ATTENDING COMMENTS
23 year old single female, domiciled in private residence with family, currently employed, no proir pphx , no hx of prior SA, BIB EMS from community referral for worsening depression, emotional dysregulation, and recent impulsive suicide attempt via strangulation with mutliple other plans. This was in the context of prior argument with parents a week prior, prompting worsening depressed, anxiety and increasing SI. Endorses passive intermittent SI at baseline, recent SA was impulsive in nature and in response to poor distress tolerance, currently reports feeling brighter sincea admission, denies current SI/SP, future oriented. Does not believe she needs medications at this time.
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
## 2024-05-03 NOTE — BH INPATIENT PSYCHIATRY ASSESSMENT NOTE - CURRENT MEDICATION
MEDICATIONS  (STANDING):    MEDICATIONS  (PRN):  hydrOXYzine hydrochloride 25 milliGRAM(s) Oral every 6 hours PRN Anxiety/ sleep disturbances  LORazepam     Tablet 1 milliGRAM(s) Oral every 6 hours PRN agitation/ severe anxiety  LORazepam   Injectable 2 milliGRAM(s) IntraMuscular Once PRN severe agitation   MEDICATIONS  (STANDING):    MEDICATIONS  (PRN):  hydrOXYzine hydrochloride 25 milliGRAM(s) Oral every 6 hours PRN Anxiety/ sleep disturbances  LORazepam     Tablet 1 milliGRAM(s) Oral every 6 hours PRN agitation/ severe anxiety  LORazepam   Injectable 2 milliGRAM(s) IntraMuscular Once PRN severe agitation  melatonin. 3 milliGRAM(s) Oral at bedtime PRN Insomnia

## 2024-05-03 NOTE — BH SOCIAL WORK INITIAL PSYCHOSOCIAL EVALUATION - NSBHSPIRITUALEFFECTFT_PSY_ALL_CORE
pt reports previously attending [employer] - practicing catholicism (Last attended on Easter)

## 2024-05-03 NOTE — BH SOCIAL WORK INITIAL PSYCHOSOCIAL EVALUATION - NSBHABUSENEGLECTHX_PSY_ALL_CORE
Patient states he was given a prescription by Dr. Oneill with instructions he does not understand.  Please advise   Mobile [phone]      No

## 2024-05-03 NOTE — BH INPATIENT PSYCHIATRY ASSESSMENT NOTE - NSBHCHARTREVIEWVS_PSY_A_CORE FT
Vital Signs Last 24 Hrs  T(C): 36.9 (05-02-24 @ 20:51), Max: 36.9 (05-02-24 @ 20:51)  T(F): 98.4 (05-02-24 @ 20:51), Max: 98.4 (05-02-24 @ 20:51)  HR: 65 (05-02-24 @ 16:00) (65 - 65)  BP: 120/83 (05-02-24 @ 16:00) (120/83 - 120/83)  BP(mean): --  RR: 17 (05-02-24 @ 21:37) (17 - 18)  SpO2: 100% (05-02-24 @ 16:00) (100% - 100%)    Orthostatic VS  05-02-24 @ 20:51  Lying BP: --/-- HR: --  Sitting BP: 126/85 HR: 91  Standing BP: 123/82 HR: 96  Site: --  Mode: --   Vital Signs Last 24 Hrs  T(C): 36.3 (05-03-24 @ 08:08), Max: 36.9 (05-02-24 @ 20:51)  T(F): 97.4 (05-03-24 @ 08:08), Max: 98.4 (05-02-24 @ 20:51)  HR: 65 (05-02-24 @ 16:00) (65 - 65)  BP: 120/83 (05-02-24 @ 16:00) (120/83 - 120/83)  BP(mean): --  RR: 17 (05-02-24 @ 21:37) (17 - 18)  SpO2: 100% (05-02-24 @ 16:00) (100% - 100%)    Orthostatic VS  05-03-24 @ 08:08  Lying BP: --/-- HR: --  Sitting BP: 94/65 HR: 63  Standing BP: 99/60 HR: 74  Site: upper left arm  Mode: electronic  Orthostatic VS  05-02-24 @ 20:51  Lying BP: --/-- HR: --  Sitting BP: 126/85 HR: 91  Standing BP: 123/82 HR: 96  Site: --  Mode: --   Vital Signs Last 24 Hrs  T(C): 36.2 (05-06-24 @ 08:40), Max: 36.8 (05-05-24 @ 19:14)  T(F): 97.1 (05-06-24 @ 08:40), Max: 98.2 (05-05-24 @ 19:14)  HR: --  BP: --  BP(mean): --  RR: 16 (05-05-24 @ 20:07) (16 - 16)  SpO2: --    Orthostatic VS  05-06-24 @ 08:40  Lying BP: --/-- HR: --  Sitting BP: 90/61 HR: 80  Standing BP: 110/69 HR: 95  Site: --  Mode: --  Orthostatic VS  05-05-24 @ 19:14  Lying BP: --/-- HR: --  Sitting BP: 116/68 HR: 67  Standing BP: 114/77 HR: 75  Site: --  Mode: --  Orthostatic VS  05-05-24 @ 08:26  Lying BP: --/-- HR: --  Sitting BP: 101/60 HR: 60  Standing BP: 98/64 HR: 71  Site: upper left arm  Mode: electronic  Orthostatic VS  05-04-24 @ 19:06  Lying BP: --/-- HR: --  Sitting BP: 121/58 HR: 75  Standing BP: 114/63 HR: 79  Site: --  Mode: --
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
## 2024-05-03 NOTE — BH INPATIENT PSYCHIATRY ASSESSMENT NOTE - NSBHMETABOLIC_PSY_ALL_CORE_FT
BMI:   HbA1c:   Glucose:   BP: 120/83 (05-02-24 @ 16:00) (120/83 - 120/83)Vital Signs Last 24 Hrs  T(C): 36.9 (05-02-24 @ 20:51), Max: 36.9 (05-02-24 @ 20:51)  T(F): 98.4 (05-02-24 @ 20:51), Max: 98.4 (05-02-24 @ 20:51)  HR: 65 (05-02-24 @ 16:00) (65 - 65)  BP: 120/83 (05-02-24 @ 16:00) (120/83 - 120/83)  BP(mean): --  RR: 17 (05-02-24 @ 21:37) (17 - 18)  SpO2: 100% (05-02-24 @ 16:00) (100% - 100%)    Orthostatic VS  05-02-24 @ 20:51  Lying BP: --/-- HR: --  Sitting BP: 126/85 HR: 91  Standing BP: 123/82 HR: 96  Site: --  Mode: --    Lipid Panel:  BMI:   HbA1c:   Glucose:   BP: 120/83 (05-02-24 @ 16:00) (120/83 - 120/83)Vital Signs Last 24 Hrs  T(C): 36.3 (05-03-24 @ 08:08), Max: 36.9 (05-02-24 @ 20:51)  T(F): 97.4 (05-03-24 @ 08:08), Max: 98.4 (05-02-24 @ 20:51)  HR: 65 (05-02-24 @ 16:00) (65 - 65)  BP: 120/83 (05-02-24 @ 16:00) (120/83 - 120/83)  BP(mean): --  RR: 17 (05-02-24 @ 21:37) (17 - 18)  SpO2: 100% (05-02-24 @ 16:00) (100% - 100%)    Orthostatic VS  05-03-24 @ 08:08  Lying BP: --/-- HR: --  Sitting BP: 94/65 HR: 63  Standing BP: 99/60 HR: 74  Site: upper left arm  Mode: electronic  Orthostatic VS  05-02-24 @ 20:51  Lying BP: --/-- HR: --  Sitting BP: 126/85 HR: 91  Standing BP: 123/82 HR: 96  Site: --  Mode: --    Lipid Panel:  BMI:   HbA1c: A1C with Estimated Average Glucose Result: 5.3 % (05-03-24 @ 09:05)    Glucose:   BP: --Vital Signs Last 24 Hrs  T(C): 36.2 (05-06-24 @ 08:40), Max: 36.8 (05-05-24 @ 19:14)  T(F): 97.1 (05-06-24 @ 08:40), Max: 98.2 (05-05-24 @ 19:14)  HR: --  BP: --  BP(mean): --  RR: 16 (05-05-24 @ 20:07) (16 - 16)  SpO2: --    Orthostatic VS  05-06-24 @ 08:40  Lying BP: --/-- HR: --  Sitting BP: 90/61 HR: 80  Standing BP: 110/69 HR: 95  Site: --  Mode: --  Orthostatic VS  05-05-24 @ 19:14  Lying BP: --/-- HR: --  Sitting BP: 116/68 HR: 67  Standing BP: 114/77 HR: 75  Site: --  Mode: --  Orthostatic VS  05-05-24 @ 08:26  Lying BP: --/-- HR: --  Sitting BP: 101/60 HR: 60  Standing BP: 98/64 HR: 71  Site: upper left arm  Mode: electronic  Orthostatic VS  05-04-24 @ 19:06  Lying BP: --/-- HR: --  Sitting BP: 121/58 HR: 75  Standing BP: 114/63 HR: 79  Site: --  Mode: --    Lipid Panel: Date/Time: 05-03-24 @ 09:09  Cholesterol, Serum: 184  LDL Cholesterol Calculated: 111  HDL Cholesterol, Serum: 64  Total Cholesterol/HDL Ration Measurement: --  Triglycerides, Serum: 43
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
## 2024-05-03 NOTE — BH SOCIAL WORK INITIAL PSYCHOSOCIAL EVALUATION - PUBLIC BENEFITS
AMBULATORY CASE MANAGEMENT NOTE    Name and Relationship of Patient/Support Person: TORI OLIVER - Emergency Contact    Patient Outreach    Daughter calls to inform she got the message and they did find his foot rest over in the corner of a room in the house and are appreciative of the information and support. She will reach out if any other needs. I did discuss the other options of [facility] and [facility] in [place] if they need a total replacement chair and she verbalizes agreement and understanding. Closing HRCM for goals met.     Education Documentation  No documentation found.        Emelyn PONCE  Ambulatory Case Management    3/6/2024, 13:27 EST   no
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
## 2024-05-03 NOTE — BH INPATIENT PSYCHIATRY ASSESSMENT NOTE - NSBHCHARTREVIEWLAB_PSY_A_CORE FT
Admission labs reviewed no acute findings  utox negative  BAL <10   UA unremarkable  TSH 1.02  HCG< 1.0

## 2024-05-04 PROCEDURE — 99232 SBSQ HOSP IP/OBS MODERATE 35: CPT

## 2024-05-04 NOTE — BH INPATIENT PSYCHIATRY PROGRESS NOTE - NSBHASSESSSUMMFT_PSY_ALL_CORE
Patient is a  23 year old single female, no dependents.  Patient domiciles in private residence with family, currently employed.  Patient has no PPH. Patient has no prior inpatient hospitalizations.  Patient BIB EMS as a referral from [facility] for evaluation of depression + anxiety + attempted suicide (over the weekend). Patient is now hospitalized with a primary problem of emotional decompensation and SI (she had planned to buy  a gun then shooting self or possibility of overdosing on pills tylenol). However patient placed a rope around her neck then strangulated self (for about 10 seconds until the rope snapped/ broke).  Patient admitted to [facility] on a 9.13 legal status. Patient requires inpatient hospitalization due to symptoms of mental illness so severe that they significantly interfere with activities of daily living, and presents a potential danger to self as a result of acute emotional decompensation with s/p SA,  recent episode of intentional self-injury. She is requiring 24-hour care at this time as a result, for psychiatric stabilization and safety.    Plan:  >Legal: 9.13  >Obs: Routine, no current SI. no need for CO, patient not expected to pose risk to self or others in controlled inpatient setting  >Psychiatric Meds:   -pt declined medications at this time  PRN medications:  Ativan 2mg oral Q6HR PRN for agitation and anxiety.  Haldol 5mg oral Q6HR PRN for agitation.   Benadryl 50mg oral Q6HR PRN for agitation.   >Labs: Admission labs reviewed, no acute findings. U-tox negative.  Labs pending for today: A1c and Lipid panel. Hold antipsychotics if QTc >500  >Medical:  No acute concerns. No consultations needed at this time. No indication for CIWA. Patient with consistently stable VS, no visible physical symptoms of withdrawal.   During the course of treatment, will collaborate with medical team to manage medical issues.  >Diet: Regular  >Social: milieu/structured therapy  >Treatment Interventions: Groups and Individual Therapy/CBT, Motivational counseling for substance abuse related issues.   >Dispo: Collateral and dispo planning pending further symptom and medication optimization

## 2024-05-04 NOTE — BH INPATIENT PSYCHIATRY PROGRESS NOTE - NSBHMETABOLIC_PSY_ALL_CORE_FT
BMI:   HbA1c: A1C with Estimated Average Glucose Result: 5.3 % (05-03-24 @ 09:05)    Glucose:   BP: 120/83 (05-02-24 @ 16:00) (120/83 - 120/83)Vital Signs Last 24 Hrs  T(C): 36.4 (05-04-24 @ 08:33), Max: 36.8 (05-03-24 @ 18:55)  T(F): 97.5 (05-04-24 @ 08:33), Max: 98.3 (05-03-24 @ 18:55)  HR: --  BP: --  BP(mean): --  RR: 18 (05-03-24 @ 20:33) (17 - 18)  SpO2: --    Orthostatic VS  05-04-24 @ 08:33  Lying BP: --/-- HR: --  Sitting BP: 97/66 HR: 74  Standing BP: 97/59 HR: 89  Site: --  Mode: --  Orthostatic VS  05-03-24 @ 18:55  Lying BP: --/-- HR: --  Sitting BP: 113/62 HR: 72  Standing BP: 123/72 HR: 92  Site: --  Mode: electronic  Orthostatic VS  05-03-24 @ 08:08  Lying BP: --/-- HR: --  Sitting BP: 94/65 HR: 63  Standing BP: 99/60 HR: 74  Site: upper left arm  Mode: electronic  Orthostatic VS  05-02-24 @ 20:51  Lying BP: --/-- HR: --  Sitting BP: 126/85 HR: 91  Standing BP: 123/82 HR: 96  Site: --  Mode: --    Lipid Panel: Date/Time: 05-03-24 @ 09:09  Cholesterol, Serum: 184  LDL Cholesterol Calculated: 111  HDL Cholesterol, Serum: 64  Total Cholesterol/HDL Ration Measurement: --  Triglycerides, Serum: 43

## 2024-05-04 NOTE — BH INPATIENT PSYCHIATRY PROGRESS NOTE - NSBHCHARTREVIEWVS_PSY_A_CORE FT
Vital Signs Last 24 Hrs  T(C): 36.4 (05-04-24 @ 08:33), Max: 36.8 (05-03-24 @ 18:55)  T(F): 97.5 (05-04-24 @ 08:33), Max: 98.3 (05-03-24 @ 18:55)  HR: --  BP: --  BP(mean): --  RR: 18 (05-03-24 @ 20:33) (17 - 18)  SpO2: --    Orthostatic VS  05-04-24 @ 08:33  Lying BP: --/-- HR: --  Sitting BP: 97/66 HR: 74  Standing BP: 97/59 HR: 89  Site: --  Mode: --  Orthostatic VS  05-03-24 @ 18:55  Lying BP: --/-- HR: --  Sitting BP: 113/62 HR: 72  Standing BP: 123/72 HR: 92  Site: --  Mode: electronic  Orthostatic VS  05-03-24 @ 08:08  Lying BP: --/-- HR: --  Sitting BP: 94/65 HR: 63  Standing BP: 99/60 HR: 74  Site: upper left arm  Mode: electronic  Orthostatic VS  05-02-24 @ 20:51  Lying BP: --/-- HR: --  Sitting BP: 126/85 HR: 91  Standing BP: 123/82 HR: 96  Site: --  Mode: --

## 2024-05-04 NOTE — BH INPATIENT PSYCHIATRY PROGRESS NOTE - PRN MEDS
MEDICATIONS  (PRN):  hydrOXYzine hydrochloride 25 milliGRAM(s) Oral every 6 hours PRN Anxiety/ sleep disturbances  LORazepam     Tablet 1 milliGRAM(s) Oral every 6 hours PRN agitation/ severe anxiety  LORazepam   Injectable 2 milliGRAM(s) IntraMuscular Once PRN severe agitation

## 2024-05-04 NOTE — BH INPATIENT PSYCHIATRY PROGRESS NOTE - CURRENT MEDICATION
MEDICATIONS  (STANDING):    MEDICATIONS  (PRN):  hydrOXYzine hydrochloride 25 milliGRAM(s) Oral every 6 hours PRN Anxiety/ sleep disturbances  LORazepam     Tablet 1 milliGRAM(s) Oral every 6 hours PRN agitation/ severe anxiety  LORazepam   Injectable 2 milliGRAM(s) IntraMuscular Once PRN severe agitation

## 2024-05-04 NOTE — BH INPATIENT PSYCHIATRY PROGRESS NOTE - NSBHFUPINTERVALHXFT_PSY_A_CORE
Pt not on standing medication.  Chart reviewed, no events reported overnight.  Pt observed visible during fresh air break, socializing with peers.  Pt reports she does not feel depressed or anxious anymore and she knows she had inappropriate thoughts before.  Pt denies SI/HI/I/P or AH/VH or paranoia.  Pt eating and sleeping well.

## 2024-05-05 PROCEDURE — 99232 SBSQ HOSP IP/OBS MODERATE 35: CPT

## 2024-05-05 RX ORDER — LANOLIN ALCOHOL/MO/W.PET/CERES
3 CREAM (GRAM) TOPICAL AT BEDTIME
Refills: 0 | Status: DISCONTINUED | OUTPATIENT
Start: 2024-05-05 | End: 2024-05-10

## 2024-05-05 NOTE — BH INPATIENT PSYCHIATRY PROGRESS NOTE - NSBHFUPINTERVALHXFT_PSY_A_CORE
Pt not on standing medication.  Chart reviewed, no events reported overnight.  Pt reports poor sleep last night and she woke up early.  Pt agrees to try Melatonin PRN for sleep, but does not want any other medications.  Pt reports she does not feel depressed or anxious.  Pt denies SI/HI/I/P or AH/VH or paranoia.  Pt eating well.

## 2024-05-05 NOTE — BH INPATIENT PSYCHIATRY PROGRESS NOTE - NSBHCHARTREVIEWVS_PSY_A_CORE FT
Vital Signs Last 24 Hrs  T(C): 37.1 (05-05-24 @ 08:26), Max: 37.1 (05-05-24 @ 08:26)  T(F): 98.7 (05-05-24 @ 08:26), Max: 98.7 (05-05-24 @ 08:26)  HR: --  BP: --  BP(mean): --  RR: 16 (05-05-24 @ 08:26) (16 - 16)  SpO2: --    Orthostatic VS  05-05-24 @ 08:26  Lying BP: --/-- HR: --  Sitting BP: 101/60 HR: 60  Standing BP: 98/64 HR: 71  Site: upper left arm  Mode: electronic  Orthostatic VS  05-04-24 @ 19:06  Lying BP: --/-- HR: --  Sitting BP: 121/58 HR: 75  Standing BP: 114/63 HR: 79  Site: --  Mode: --  Orthostatic VS  05-04-24 @ 08:33  Lying BP: --/-- HR: --  Sitting BP: 97/66 HR: 74  Standing BP: 97/59 HR: 89  Site: --  Mode: --  Orthostatic VS  05-03-24 @ 18:55  Lying BP: --/-- HR: --  Sitting BP: 113/62 HR: 72  Standing BP: 123/72 HR: 92  Site: --  Mode: electronic

## 2024-05-05 NOTE — BH INPATIENT PSYCHIATRY PROGRESS NOTE - NSBHASSESSSUMMFT_PSY_ALL_CORE
Patient is a  23 year old single female, no dependents.  Patient domiciles in private residence with family, currently employed.  Patient has no PPH. Patient has no prior inpatient hospitalizations.  Patient BIB EMS as a referral from [facility] for evaluation of depression + anxiety + attempted suicide (over the weekend). Patient is now hospitalized with a primary problem of emotional decompensation and SI (she had planned to buy  a gun then shooting self or possibility of overdosing on pills tylenol). However patient placed a rope around her neck then strangulated self (for about 10 seconds until the rope snapped/ broke).  Patient admitted to [facility] on a 9.13 legal status. Patient requires inpatient hospitalization due to symptoms of mental illness so severe that they significantly interfere with activities of daily living, and presents a potential danger to self as a result of acute emotional decompensation with s/p SA,  recent episode of intentional self-injury. She is requiring 24-hour care at this time as a result, for psychiatric stabilization and safety.    Plan:  >Legal: 9.13  >Obs: Routine, no current SI. no need for CO, patient not expected to pose risk to self or others in controlled inpatient setting  >Psychiatric Meds:   -pt declined medications at this time  PRN medications:  Ativan 2mg oral Q6HR PRN for agitation and anxiety.  Haldol 5mg oral Q6HR PRN for agitation.   Benadryl 50mg oral Q6HR PRN for agitation.   >Labs: Admission labs reviewed, no acute findings. U-tox negative.  Labs pending for today: A1c and Lipid panel. Hold antipsychotics if QTc >500  >Medical:  No acute concerns. No consultations needed at this time. No indication for CIWA. Patient with consistently stable VS, no visible physical symptoms of withdrawal.   During the course of treatment, will collaborate with medical team to manage medical issues.  >Diet: Regular  >Social: milieu/structured therapy  >Treatment Interventions: Groups and Individual Therapy/CBT, Motivational counseling for substance abuse related issues.   >Dispo: Collateral and dispo planning pending further symptom and medication optimization

## 2024-05-05 NOTE — BH INPATIENT PSYCHIATRY PROGRESS NOTE - NSBHMETABOLIC_PSY_ALL_CORE_FT
BMI:   HbA1c: A1C with Estimated Average Glucose Result: 5.3 % (05-03-24 @ 09:05)    Glucose:   BP: 120/83 (05-02-24 @ 16:00) (120/83 - 120/83)Vital Signs Last 24 Hrs  T(C): 37.1 (05-05-24 @ 08:26), Max: 37.1 (05-05-24 @ 08:26)  T(F): 98.7 (05-05-24 @ 08:26), Max: 98.7 (05-05-24 @ 08:26)  HR: --  BP: --  BP(mean): --  RR: 16 (05-05-24 @ 08:26) (16 - 16)  SpO2: --    Orthostatic VS  05-05-24 @ 08:26  Lying BP: --/-- HR: --  Sitting BP: 101/60 HR: 60  Standing BP: 98/64 HR: 71  Site: upper left arm  Mode: electronic  Orthostatic VS  05-04-24 @ 19:06  Lying BP: --/-- HR: --  Sitting BP: 121/58 HR: 75  Standing BP: 114/63 HR: 79  Site: --  Mode: --  Orthostatic VS  05-04-24 @ 08:33  Lying BP: --/-- HR: --  Sitting BP: 97/66 HR: 74  Standing BP: 97/59 HR: 89  Site: --  Mode: --  Orthostatic VS  05-03-24 @ 18:55  Lying BP: --/-- HR: --  Sitting BP: 113/62 HR: 72  Standing BP: 123/72 HR: 92  Site: --  Mode: electronic    Lipid Panel: Date/Time: 05-03-24 @ 09:09  Cholesterol, Serum: 184  LDL Cholesterol Calculated: 111  HDL Cholesterol, Serum: 64  Total Cholesterol/HDL Ration Measurement: --  Triglycerides, Serum: 43

## 2024-05-06 PROCEDURE — 99232 SBSQ HOSP IP/OBS MODERATE 35: CPT

## 2024-05-06 RX ADMIN — Medication 3 MILLIGRAM(S): at 22:16

## 2024-05-06 NOTE — BH INPATIENT PSYCHIATRY PROGRESS NOTE - PRN MEDS
MEDICATIONS  (PRN):  hydrOXYzine hydrochloride 25 milliGRAM(s) Oral every 6 hours PRN Anxiety/ sleep disturbances  LORazepam     Tablet 1 milliGRAM(s) Oral every 6 hours PRN agitation/ severe anxiety  LORazepam   Injectable 2 milliGRAM(s) IntraMuscular Once PRN severe agitation  melatonin. 3 milliGRAM(s) Oral at bedtime PRN Insomnia

## 2024-05-06 NOTE — BH INPATIENT PSYCHIATRY PROGRESS NOTE - NSBHCHARTREVIEWVS_PSY_A_CORE FT
Vital Signs Last 24 Hrs  T(C): 36.2 (05-06-24 @ 08:40), Max: 36.8 (05-05-24 @ 19:14)  T(F): 97.1 (05-06-24 @ 08:40), Max: 98.2 (05-05-24 @ 19:14)  HR: --  BP: --  BP(mean): --  RR: 16 (05-05-24 @ 20:07) (16 - 16)  SpO2: --    Orthostatic VS  05-06-24 @ 08:40  Lying BP: --/-- HR: --  Sitting BP: 90/61 HR: 80  Standing BP: 110/69 HR: 95  Site: --  Mode: --  Orthostatic VS  05-05-24 @ 19:14  Lying BP: --/-- HR: --  Sitting BP: 116/68 HR: 67  Standing BP: 114/77 HR: 75  Site: --  Mode: --  Orthostatic VS  05-05-24 @ 08:26  Lying BP: --/-- HR: --  Sitting BP: 101/60 HR: 60  Standing BP: 98/64 HR: 71  Site: upper left arm  Mode: electronic  Orthostatic VS  05-04-24 @ 19:06  Lying BP: --/-- HR: --  Sitting BP: 121/58 HR: 75  Standing BP: 114/63 HR: 79  Site: --  Mode: --

## 2024-05-06 NOTE — BH INPATIENT PSYCHIATRY PROGRESS NOTE - NSBHASSESSSUMMFT_PSY_ALL_CORE
Patient is a  23 year old single female, no dependents.  Patient domiciles in private residence with family, currently employed.  Patient has no PPH. Patient has no prior inpatient hospitalizations.  Patient BIB EMS as a referral from [facility] for evaluation of depression + anxiety + attempted suicide (over the weekend). Patient is now hospitalized with a primary problem of emotional decompensation and SI (she had planned to buy  a gun then shooting self or possibility of overdosing on pills tylenol). However patient placed a rope around her neck then strangulated self (for about 10 seconds until the rope snapped/ broke).  Patient admitted to [facility] on a 9.13 legal status. Patient requires inpatient hospitalization due to symptoms of mental illness so severe that they significantly interfere with activities of daily living, and presents a potential danger to self as a result of acute emotional decompensation with s/p SA,  recent episode of intentional self-injury. She is requiring 24-hour care at this time as a result, for psychiatric stabilization and safety.    Plan:  >Legal: 9.13  >Obs: Routine, no current SI. no need for CO, patient not expected to pose risk to self or others in controlled inpatient setting  >Psychiatric Meds:   -pt declined medications at this time  PRN medications:  Ativan 2mg oral Q6HR PRN for agitation and anxiety.  Haldol 5mg oral Q6HR PRN for agitation.   Benadryl 50mg oral Q6HR PRN for agitation.   >Labs: Admission labs reviewed, no acute findings. U-tox negative.  Labs pending for today: A1c and Lipid panel. Hold antipsychotics if QTc >500  >Medical:  No acute concerns. No consultations needed at this time. No indication for CIWA. Patient with consistently stable VS, no visible physical symptoms of withdrawal.   During the course of treatment, will collaborate with medical team to manage medical issues.  >Diet: Regular  >Social: milieu/structured therapy  >Treatment Interventions: Groups and Individual Therapy/CBT, Motivational counseling for substance abuse related issues.   >Dispo: Collateral and dispo planning pending further symptom and medication optimization

## 2024-05-06 NOTE — BH INPATIENT PSYCHIATRY PROGRESS NOTE - CURRENT MEDICATION
MEDICATIONS  (STANDING):    MEDICATIONS  (PRN):  hydrOXYzine hydrochloride 25 milliGRAM(s) Oral every 6 hours PRN Anxiety/ sleep disturbances  LORazepam     Tablet 1 milliGRAM(s) Oral every 6 hours PRN agitation/ severe anxiety  LORazepam   Injectable 2 milliGRAM(s) IntraMuscular Once PRN severe agitation  melatonin. 3 milliGRAM(s) Oral at bedtime PRN Insomnia

## 2024-05-06 NOTE — BH INPATIENT PSYCHIATRY PROGRESS NOTE - NSBHFUPINTERVALHXFT_PSY_A_CORE
Patient see and examined. Case discussed with treatment plan. Chart reviewed. No acute overnight events reported. Patient not on any standing medications. Denies AVH/HI. Sleeping well with good appetite. In good behavioral control. Pt denies acute depressed or anxious, overall brighter since admission. Denies active or passive SI/SP, highly future oriented and regretful of suicidal behaviors prior to admission. Pt reports being able to use better coping mechanisms and reports wish to engage in outpatient psychotherapy.

## 2024-05-06 NOTE — BH INPATIENT PSYCHIATRY PROGRESS NOTE - NSBHMETABOLIC_PSY_ALL_CORE_FT
BMI:   HbA1c: A1C with Estimated Average Glucose Result: 5.3 % (05-03-24 @ 09:05)    Glucose:   BP: --Vital Signs Last 24 Hrs  T(C): 36.2 (05-06-24 @ 08:40), Max: 36.8 (05-05-24 @ 19:14)  T(F): 97.1 (05-06-24 @ 08:40), Max: 98.2 (05-05-24 @ 19:14)  HR: --  BP: --  BP(mean): --  RR: 16 (05-05-24 @ 20:07) (16 - 16)  SpO2: --    Orthostatic VS  05-06-24 @ 08:40  Lying BP: --/-- HR: --  Sitting BP: 90/61 HR: 80  Standing BP: 110/69 HR: 95  Site: --  Mode: --  Orthostatic VS  05-05-24 @ 19:14  Lying BP: --/-- HR: --  Sitting BP: 116/68 HR: 67  Standing BP: 114/77 HR: 75  Site: --  Mode: --  Orthostatic VS  05-05-24 @ 08:26  Lying BP: --/-- HR: --  Sitting BP: 101/60 HR: 60  Standing BP: 98/64 HR: 71  Site: upper left arm  Mode: electronic  Orthostatic VS  05-04-24 @ 19:06  Lying BP: --/-- HR: --  Sitting BP: 121/58 HR: 75  Standing BP: 114/63 HR: 79  Site: --  Mode: --    Lipid Panel: Date/Time: 05-03-24 @ 09:09  Cholesterol, Serum: 184  LDL Cholesterol Calculated: 111  HDL Cholesterol, Serum: 64  Total Cholesterol/HDL Ration Measurement: --  Triglycerides, Serum: 43

## 2024-05-07 PROCEDURE — 99232 SBSQ HOSP IP/OBS MODERATE 35: CPT

## 2024-05-07 NOTE — BH INPATIENT PSYCHIATRY PROGRESS NOTE - NSBHCHARTREVIEWVS_PSY_A_CORE FT
Vital Signs Last 24 Hrs  T(C): 36.8 (05-06-24 @ 19:53), Max: 36.8 (05-06-24 @ 19:53)  T(F): 98.3 (05-06-24 @ 19:53), Max: 98.3 (05-06-24 @ 19:53)  HR: --  BP: --  BP(mean): --  RR: 18 (05-06-24 @ 20:45) (18 - 18)  SpO2: --    Orthostatic VS  05-06-24 @ 19:53  Lying BP: --/-- HR: --  Sitting BP: 112/70 HR: 75  Standing BP: 111/70 HR: 86  Site: --  Mode: --  Orthostatic VS  05-06-24 @ 08:40  Lying BP: --/-- HR: --  Sitting BP: 90/61 HR: 80  Standing BP: 110/69 HR: 95  Site: --  Mode: --  Orthostatic VS  05-05-24 @ 19:14  Lying BP: --/-- HR: --  Sitting BP: 116/68 HR: 67  Standing BP: 114/77 HR: 75  Site: --  Mode: --  Orthostatic VS  05-05-24 @ 08:26  Lying BP: --/-- HR: --  Sitting BP: 101/60 HR: 60  Standing BP: 98/64 HR: 71  Site: upper left arm  Mode: electronic   Vital Signs Last 24 Hrs  T(C): 36.6 (05-07-24 @ 07:53), Max: 36.8 (05-06-24 @ 19:53)  T(F): 97.9 (05-07-24 @ 07:53), Max: 98.3 (05-06-24 @ 19:53)  HR: --  BP: --  BP(mean): --  RR: 18 (05-07-24 @ 07:11) (18 - 18)  SpO2: --    Orthostatic VS  05-07-24 @ 07:53  Lying BP: --/-- HR: --  Sitting BP: 111/65 HR: 77  Standing BP: 104/62 HR: 93  Site: --  Mode: --  Orthostatic VS  05-06-24 @ 19:53  Lying BP: --/-- HR: --  Sitting BP: 112/70 HR: 75  Standing BP: 111/70 HR: 86  Site: --  Mode: --  Orthostatic VS  05-06-24 @ 08:40  Lying BP: --/-- HR: --  Sitting BP: 90/61 HR: 80  Standing BP: 110/69 HR: 95  Site: --  Mode: --  Orthostatic VS  05-05-24 @ 19:14  Lying BP: --/-- HR: --  Sitting BP: 116/68 HR: 67  Standing BP: 114/77 HR: 75  Site: --  Mode: --   Vital Signs Last 24 Hrs  T(C): 36.8 (05-08-24 @ 08:38), Max: 36.8 (05-08-24 @ 08:38)  T(F): 98.3 (05-08-24 @ 08:38), Max: 98.3 (05-08-24 @ 08:38)  HR: --  BP: --  BP(mean): --  RR: --  SpO2: --    Orthostatic VS  05-08-24 @ 08:38  Lying BP: --/-- HR: --  Sitting BP: 106/62 HR: 90  Standing BP: 103/68 HR: 99  Site: --  Mode: --  Orthostatic VS  05-07-24 @ 19:10  Lying BP: --/-- HR: --  Sitting BP: 118/66 HR: 76  Standing BP: 121/67 HR: 83  Site: --  Mode: --  Orthostatic VS  05-07-24 @ 07:53  Lying BP: --/-- HR: --  Sitting BP: 111/65 HR: 77  Standing BP: 104/62 HR: 93  Site: --  Mode: --  Orthostatic VS  05-06-24 @ 19:53  Lying BP: --/-- HR: --  Sitting BP: 112/70 HR: 75  Standing BP: 111/70 HR: 86  Site: --  Mode: --

## 2024-05-07 NOTE — BH INPATIENT PSYCHIATRY PROGRESS NOTE - NSBHFUPINTERVALHXFT_PSY_A_CORE
Pt was seen and evaluated. No interval events. Pt is eating and sleeping well. Pt reported her mood was "better". Denied SI or HI. Pt engaging in safety plan. Denied any pain or discomfort. Pt stated she is attending groups and participating in the sessions. Pt remains discharge focused. Stated she has started making plans for when she is discharged and plans on "meeting with her boyfriend and going to a party". VSS: temp 97.9, 111/65 (sitting), HR 77 (sitting), 104/62 (standing), HR 93 (standing).

## 2024-05-07 NOTE — BH INPATIENT PSYCHIATRY PROGRESS NOTE - NSBHASSESSSUMMFT_PSY_ALL_CORE
Patient is a  23 year old single female, no dependents.  Patient domiciles in private residence with family, currently employed.  Patient has no PPH. Patient has no prior inpatient hospitalizations.  Patient BIB EMS as a referral from [facility] for evaluation of depression + anxiety + attempted suicide (over the weekend). Patient is now hospitalized with a primary problem of emotional decompensation and SI (she had planned to buy  a gun then shooting self or possibility of overdosing on pills tylenol). However patient placed a rope around her neck then strangulated self (for about 10 seconds until the rope snapped/ broke).  Patient admitted to [facility] on a 9.13 legal status. Patient requires inpatient hospitalization due to symptoms of mental illness so severe that they significantly interfere with activities of daily living, and presents a potential danger to self as a result of acute emotional decompensation with s/p SA,  recent episode of intentional self-injury. She is requiring 24-hour care at this time as a result, for psychiatric stabilization and safety.    Plan:  >Legal: 9.13  >Obs: Routine, no current SI. no need for CO, patient not expected to pose risk to self or others in controlled inpatient setting  >Psychiatric Meds:   -pt declined medications at this time  PRN medications:  Ativan 2mg oral Q6HR PRN for agitation and anxiety.  Haldol 5mg oral Q6HR PRN for agitation.   Benadryl 50mg oral Q6HR PRN for agitation.   >Labs: Admission labs reviewed, no acute findings. U-tox negative.  Labs pending for today: A1c and Lipid panel. Hold antipsychotics if QTc >500  >Medical:  No acute concerns. No consultations needed at this time. No indication for CIWA. Patient with consistently stable VS, no visible physical symptoms of withdrawal.   During the course of treatment, will collaborate with medical team to manage medical issues.  >Diet: Regular  >Social: milieu/structured therapy  >Treatment Interventions: Groups and Individual Therapy/CBT, Motivational counseling for substance abuse related issues.   >Dispo: Collateral and dispo planning pending further symptom and medication optimization     Patient is a  23 year old single female, no dependents.  Patient domiciles in private residence with family, currently employed.  Patient has no PPH. Patient has no prior inpatient hospitalizations.  Patient BIB EMS as a referral from [facility] for evaluation of depression + anxiety + attempted suicide (over the weekend). Patient is now hospitalized with a primary problem of emotional decompensation and SI (she had planned to buy  a gun then shooting self or possibility of overdosing on pills tylenol). However patient placed a rope around her neck then strangulated self (for about 10 seconds until the rope snapped/ broke).  Patient admitted to [facility] on a 9.13 legal status. Patient requires inpatient hospitalization due to symptoms of mental illness so severe that they significantly interfere with activities of daily living, and presents a potential danger to self as a result of acute emotional decompensation with s/p SA,  recent episode of intentional self-injury. She is requiring 24-hour care at this time as a result, for psychiatric stabilization and safety.    Plan:  >Legal: 9.13  >Obs: Routine, no current SI. no need for CO, patient not expected to pose risk to self or others in controlled inpatient setting  >Psychiatric Meds:   -pt declined medications at this time  PRN medications:  Ativan 2mg oral Q6HR PRN for agitation and anxiety.  Haldol 5mg oral Q6HR PRN for agitation.   Benadryl 50mg oral Q6HR PRN for agitation.   >Labs: Admission labs reviewed, no acute findings. U-tox negative.  Labs pending for today: A1c and Lipid panel. Hold antipsychotics if QTc >500  >Medical:  No acute concerns. No consultations needed at this time. No indication for CIWA. Patient with consistently stable VS, no visible physical symptoms of withdrawal.   During the course of treatment, will collaborate with medical team to manage medical issues.  >Diet: Regular  >Social: milieu/structured therapy  >Treatment Interventions: Groups and Individual Therapy/CBT, Motivational counseling for substance abuse related issues.   >Dispo: Collateral and dispo planning pending further symptom and medication optimization. DC on 5/10

## 2024-05-07 NOTE — BH INPATIENT PSYCHIATRY PROGRESS NOTE - NSBHMETABOLIC_PSY_ALL_CORE_FT
BMI:   HbA1c: A1C with Estimated Average Glucose Result: 5.3 % (05-03-24 @ 09:05)    Glucose:   BP: --Vital Signs Last 24 Hrs  T(C): 36.8 (05-06-24 @ 19:53), Max: 36.8 (05-06-24 @ 19:53)  T(F): 98.3 (05-06-24 @ 19:53), Max: 98.3 (05-06-24 @ 19:53)  HR: --  BP: --  BP(mean): --  RR: 18 (05-06-24 @ 20:45) (18 - 18)  SpO2: --    Orthostatic VS  05-06-24 @ 19:53  Lying BP: --/-- HR: --  Sitting BP: 112/70 HR: 75  Standing BP: 111/70 HR: 86  Site: --  Mode: --  Orthostatic VS  05-06-24 @ 08:40  Lying BP: --/-- HR: --  Sitting BP: 90/61 HR: 80  Standing BP: 110/69 HR: 95  Site: --  Mode: --  Orthostatic VS  05-05-24 @ 19:14  Lying BP: --/-- HR: --  Sitting BP: 116/68 HR: 67  Standing BP: 114/77 HR: 75  Site: --  Mode: --  Orthostatic VS  05-05-24 @ 08:26  Lying BP: --/-- HR: --  Sitting BP: 101/60 HR: 60  Standing BP: 98/64 HR: 71  Site: upper left arm  Mode: electronic    Lipid Panel: Date/Time: 05-03-24 @ 09:09  Cholesterol, Serum: 184  LDL Cholesterol Calculated: 111  HDL Cholesterol, Serum: 64  Total Cholesterol/HDL Ration Measurement: --  Triglycerides, Serum: 43   BMI:   HbA1c: A1C with Estimated Average Glucose Result: 5.3 % (05-03-24 @ 09:05)    Glucose:   BP: --Vital Signs Last 24 Hrs  T(C): 36.6 (05-07-24 @ 07:53), Max: 36.8 (05-06-24 @ 19:53)  T(F): 97.9 (05-07-24 @ 07:53), Max: 98.3 (05-06-24 @ 19:53)  HR: --  BP: --  BP(mean): --  RR: 18 (05-07-24 @ 07:11) (18 - 18)  SpO2: --    Orthostatic VS  05-07-24 @ 07:53  Lying BP: --/-- HR: --  Sitting BP: 111/65 HR: 77  Standing BP: 104/62 HR: 93  Site: --  Mode: --  Orthostatic VS  05-06-24 @ 19:53  Lying BP: --/-- HR: --  Sitting BP: 112/70 HR: 75  Standing BP: 111/70 HR: 86  Site: --  Mode: --  Orthostatic VS  05-06-24 @ 08:40  Lying BP: --/-- HR: --  Sitting BP: 90/61 HR: 80  Standing BP: 110/69 HR: 95  Site: --  Mode: --  Orthostatic VS  05-05-24 @ 19:14  Lying BP: --/-- HR: --  Sitting BP: 116/68 HR: 67  Standing BP: 114/77 HR: 75  Site: --  Mode: --    Lipid Panel: Date/Time: 05-03-24 @ 09:09  Cholesterol, Serum: 184  LDL Cholesterol Calculated: 111  HDL Cholesterol, Serum: 64  Total Cholesterol/HDL Ration Measurement: --  Triglycerides, Serum: 43   BMI:   HbA1c: A1C with Estimated Average Glucose Result: 5.3 % (05-03-24 @ 09:05)    Glucose:   BP: --Vital Signs Last 24 Hrs  T(C): 36.8 (05-08-24 @ 08:38), Max: 36.8 (05-08-24 @ 08:38)  T(F): 98.3 (05-08-24 @ 08:38), Max: 98.3 (05-08-24 @ 08:38)  HR: --  BP: --  BP(mean): --  RR: --  SpO2: --    Orthostatic VS  05-08-24 @ 08:38  Lying BP: --/-- HR: --  Sitting BP: 106/62 HR: 90  Standing BP: 103/68 HR: 99  Site: --  Mode: --  Orthostatic VS  05-07-24 @ 19:10  Lying BP: --/-- HR: --  Sitting BP: 118/66 HR: 76  Standing BP: 121/67 HR: 83  Site: --  Mode: --  Orthostatic VS  05-07-24 @ 07:53  Lying BP: --/-- HR: --  Sitting BP: 111/65 HR: 77  Standing BP: 104/62 HR: 93  Site: --  Mode: --  Orthostatic VS  05-06-24 @ 19:53  Lying BP: --/-- HR: --  Sitting BP: 112/70 HR: 75  Standing BP: 111/70 HR: 86  Site: --  Mode: --    Lipid Panel: Date/Time: 05-03-24 @ 09:09  Cholesterol, Serum: 184  LDL Cholesterol Calculated: 111  HDL Cholesterol, Serum: 64  Total Cholesterol/HDL Ration Measurement: --  Triglycerides, Serum: 43

## 2024-05-08 PROCEDURE — 99232 SBSQ HOSP IP/OBS MODERATE 35: CPT

## 2024-05-08 NOTE — BH INPATIENT PSYCHIATRY DISCHARGE NOTE - ATTENDING DISCHARGE PHYSICAL EXAMINATION:
I have personally seen and evaluated patient prior to discharge. Chart reviewed and discharge plan discussed with the NP as follows. Pt prior to discharge was calm, cooperative, friendly and smiling. Patient has been attending groups with active participation.  Pt interacting well with others. No recent behavioral problems and no episodes of aggressive or dangerous behavior. No problems with sleep, appetite or energy level. Denied any active and current manic, hypomanic, depressive, psychotic or anxiety symptoms. Denied any current SI/HI/AH/VH/PI. No overt delusions.  Patient is organized and commits to safety and to ongoing outpatient treatment.   Pt asks relevant questions about his discharge.Pt articulate a plan for living life after discharge. Pt not started on psychiatric medications. Further, instructed the patient to seek help immediately by dialing "911" or by going to the nearest ER if symptoms worsen.   Chronic risk factors include hx of MDD, anxiety d/o, recent impulsive SA leading to admission   Protective factors include domiciled, previously employed, higher education, supportive friends/family network, compliant with psychiatric medications, motivated to engage in outpateint psychiatric treatment, future oriented thinking, expresses concern for well being, prior high level of functioning, much aspirations for return to career and wish to pursue pleasurable activities.  As such, its chronic risk factors are mitigated by their protective factors. Pt does not pose an acute elevated risk of imminent danger to harm to self or others. Does not require further inpatient psychiatric admisison at this time. Psychiatrically cleared for discharge.   Pt urged to avoid using any alcohol or street drugs, particularly marijuana & K2, cocaine and methamphetamine (crystal meth) once discharged.  Safety plan filled out by patient and submitted into chart.

## 2024-05-08 NOTE — BH INPATIENT PSYCHIATRY PROGRESS NOTE - NSBHASSESSSUMMFT_PSY_ALL_CORE
Patient is a  23 year old single female, no dependents.  Patient domiciles in private residence with family, currently employed.  Patient has no PPH. Patient has no prior inpatient hospitalizations.  Patient BIB EMS as a referral from [facility] for evaluation of depression + anxiety + attempted suicide (over the weekend). Patient is now hospitalized with a primary problem of emotional decompensation and SI (she had planned to buy  a gun then shooting self or possibility of overdosing on pills tylenol). However patient placed a rope around her neck then strangulated self (for about 10 seconds until the rope snapped/ broke).  Patient admitted to [facility] on a 9.13 legal status. Patient requires inpatient hospitalization due to symptoms of mental illness so severe that they significantly interfere with activities of daily living, and presents a potential danger to self as a result of acute emotional decompensation with s/p SA,  recent episode of intentional self-injury. She is requiring 24-hour care at this time as a result, for psychiatric stabilization and safety.    5/8: Patient denies SI/HI, no plan or intent.  Pt seems stable, and shows readiness for dc. Pt is participating in dc planning.  DC on Friday 5/10  Plan:  >Legal: 9.13  >Obs: Routine, no current SI. no need for CO, patient not expected to pose risk to self or others in controlled inpatient setting  >Psychiatric Meds:   -pt declined medications at this time  PRN medications:  Ativan 2mg oral Q6HR PRN for agitation and anxiety.  Haldol 5mg oral Q6HR PRN for agitation.   Benadryl 50mg oral Q6HR PRN for agitation.   >Labs: Admission labs reviewed, no acute findings. U-tox negative.  Labs pending for today: A1c and Lipid panel. Hold antipsychotics if QTc >500  >Medical:  No acute concerns. No consultations needed at this time. No indication for CIWA. Patient with consistently stable VS, no visible physical symptoms of withdrawal.   During the course of treatment, will collaborate with medical team to manage medical issues.  >Diet: Regular  >Social: milieu/structured therapy  >Treatment Interventions: Groups and Individual Therapy/CBT, Motivational counseling for substance abuse related issues.   >Dispo: Collateral and dispo planning pending further symptom and medication optimization. DC on 5/10

## 2024-05-08 NOTE — BH INPATIENT PSYCHIATRY DISCHARGE NOTE - NSBHMETABOLIC_PSY_ALL_CORE_FT
BMI:   HbA1c: A1C with Estimated Average Glucose Result: 5.3 % (05-03-24 @ 09:05)    Glucose:   BP: --Vital Signs Last 24 Hrs  T(C): 36.2 (05-07-24 @ 19:10), Max: 36.6 (05-07-24 @ 07:53)  T(F): 97.1 (05-07-24 @ 19:10), Max: 97.9 (05-07-24 @ 07:53)  HR: --  BP: --  BP(mean): --  RR: 18 (05-07-24 @ 07:11) (18 - 18)  SpO2: --    Orthostatic VS  05-07-24 @ 19:10  Lying BP: --/-- HR: --  Sitting BP: 118/66 HR: 76  Standing BP: 121/67 HR: 83  Site: --  Mode: --  Orthostatic VS  05-07-24 @ 07:53  Lying BP: --/-- HR: --  Sitting BP: 111/65 HR: 77  Standing BP: 104/62 HR: 93  Site: --  Mode: --  Orthostatic VS  05-06-24 @ 19:53  Lying BP: --/-- HR: --  Sitting BP: 112/70 HR: 75  Standing BP: 111/70 HR: 86  Site: --  Mode: --  Orthostatic VS  05-06-24 @ 08:40  Lying BP: --/-- HR: --  Sitting BP: 90/61 HR: 80  Standing BP: 110/69 HR: 95  Site: --  Mode: --    Lipid Panel: Date/Time: 05-03-24 @ 09:09  Cholesterol, Serum: 184  LDL Cholesterol Calculated: 111  HDL Cholesterol, Serum: 64  Total Cholesterol/HDL Ration Measurement: --  Triglycerides, Serum: 43

## 2024-05-08 NOTE — BH INPATIENT PSYCHIATRY DISCHARGE NOTE - NSBHDCRISKMITIGATEFT_PSY_ALL_CORE
The treatment regimen in combination with other treatment modalities led to: Improved reality testing. Improved mood and behavior stabilization. Symptoms reduction and stabilization. Functional improvement. Improved socialization. Positive thinking and gaining insight. follow up care provided to patient, and safety plan reviewed with patient.
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
## 2024-05-08 NOTE — BH INPATIENT PSYCHIATRY DISCHARGE NOTE - REASON FOR ADMISSION
emotional decompensation and SI (she had planned to buy  a gun then shooting self or possibility of overdosing on pills tylenol). However patient placed a rope around her neck then strangulated self (for about 10 seconds until the rope snapped/ broke).

## 2024-05-08 NOTE — BH INPATIENT PSYCHIATRY DISCHARGE NOTE - NSBHSUICIDESTATUS_PSY_ALL_CORE
On safety assessment on day of discharge, patient has the following risk factors which are nonmodifiable which include: gender, age,  mental illness, recent suicide attempt in _via strangulation  Modifiable risk factors: psychosis, treatment non-adherence, substance abuse, impulsivity, inadequate social support. Currently there are no modifiable risk factors that could further be addressed in the inpatient hospital setting as patient denies any depressive sxs, patient is not suicidal with no intent or plan, has improvement in sleep and energy, medication compliant, improvement in manic sxs (such as impulsivity, irritability, intrusiveness, psychomotor agitation).  Protective factors include: denies SIIP, denies HIIP, future oriented, hopeful, willingness to try medication, not depressed, no access to weapons, engaged in treatment, stable residence, support of family, engages in work, close outpatient follow up appointment.   Patient is at chronic higher risk than the general population for suicide because of risk factors as above, however, they can be mitigated by adjusting medications, medication compliance, and continued therapy.  At the time of discharge, patient not an acute danger to self or others.
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
## 2024-05-08 NOTE — BH INPATIENT PSYCHIATRY DISCHARGE NOTE - HOSPITAL COURSE
Patient is a 23 year old single female, no dependents.  Patient domiciles in private residence with family, currently employed.  Patient has no PPH. Patient has no prior inpatient hospitalizations.  Patient BIB EMS as a referral from [facility] for evaluation of depression + anxiety + attempted suicide (over the weekend). Patient was hospitalized with a primary problem of emotional decompensation and SI (she had planned to buy  a gun then shooting self or possibility of overdosing on pills tylenol). However patient placed a rope around her neck then strangulated self (for about 10 seconds until the rope snapped/ broke). Patient is a 23 year old single female, no dependents.  Patient domiciles in private residence with family, currently employed.  Patient has no PPH. Patient has no prior inpatient hospitalizations.  Patient BIB EMS as a referral from [facility] for evaluation of depression + anxiety + attempted suicide (over the weekend). Patient was hospitalized with a primary problem of emotional decompensation and SI (she had planned to buy  a gun then shooting self or possibility of overdosing on pills tylenol). However patient placed a rope around her neck then strangulated self (for about 10 seconds until the rope snapped/ broke).     On admission interview, patient presented depressed, anxious.  On unit she denied SI/SIB/HI.  No overt psychotic trend, patient denied AVH, delusions, marie or paranoia.  Following a discussion about risk/benefits of medications, patient  declined medication intervention. Patient adhered to treatment plan,  she  remained in good behavioral control and has not required emergent intramuscular medications or seclusion / restraints. Patient attended group therapy and individual sessions.  The patient was provided with motivational counseling to tackle stressors and enhance coping skills. The patient was provided with motivational counseling to abstain from illicit substances (admitting utox negative), as these can directly worsen her mood and symptoms. Narcan emergency kit offered at [facility].  Patient was also given literature on  "safety plan.”   Patient was provided with extensive psycho-education regarding importance of compliance with medications.  At discharge patient has developed coping skills and able to identify protective factors.   Patient no longer required inpatient treatment and care. Patient is not judged to be an acute danger to self or others at this time. Appeared ready and stable to be discharged to lower level of care.  By day of discharge, the patient reported feeling significantly better.  At [facility] patient's sleep improved, decreased anxiety, improved mood, denied suicidal thoughts, showed  improved symptoms and stabilization.  There were no other acute risk factors that could be mitigated by further inpatient hospitalization.   PROCEDURES AND TREATMENT:  >Individual psychotherapy/CBT modality and group therapy  >Milieu therapy and supportive therapy  >Psychopharmacologic management.  >Motivational counseling.   Patient labs were all WNL. Labs include---CBC/Chemistry/LFT/A1C/Lipid. Panel/TSH/CPK/HBA1-C/U-A/U-Tox/EKG/COVID/HCG  EKG: NSR   QTC:   Covid at discharge: nondetectable   Consultation: NA  Medical Issues: NA  Imaging: NA    Medications at dc: NA    Discharge Pan:  -Patient instructed to call 911 or go to nearest ER in case of emergency.   -Patient given Suicide Prevention Lifeline number 1-337.418.2535 and provided instructions on its use  -Patient was given follow up appointment at [facility]  Discharge Note Date and Time: 05-10-24 @ 0:34  Patient is a 23 year old single female, no dependents.  Patient domiciles in private residence with family, currently employed.  Patient has no PPH. Patient has no prior inpatient hospitalizations.  Patient BIB EMS as a referral from [facility] for evaluation of depression + anxiety + attempted suicide (over the weekend). Patient was hospitalized with a primary problem of emotional decompensation and SI (she had planned to buy  a gun then shooting self or possibility of overdosing on pills tylenol). However patient placed a rope around her neck then strangulated self (for about 10 seconds until the rope snapped/ broke).     On admission interview, patient presented depressed, anxious.  On unit she denied SI/SIB/HI.  No overt psychotic trend, patient denied AVH, delusions, marie or paranoia.  Following a discussion about risk/benefits of medications, patient  declined medication intervention. Patient adhered to treatment plan,  she  remained in good behavioral control and has not required emergent intramuscular medications or seclusion / restraints. Patient attended group therapy and individual sessions.  The patient was provided with motivational counseling to tackle stressors and enhance coping skills. The patient was provided with motivational counseling to abstain from illicit substances (admitting utox negative), as these can directly worsen her mood and symptoms. Narcan emergency kit offered at [place].  Patient was also given literature on  "safety plan.”   Patient was provided with extensive psycho-education regarding importance of compliance with medications.  At discharge patient has developed coping skills and able to identify protective factors.   Patient no longer required inpatient treatment and care. Patient is not judged to be an acute danger to self or others at this time. Appeared ready and stable to be discharged to lower level of care.  By day of discharge, the patient reported feeling significantly better.  At [place] patient's sleep improved, decreased anxiety, improved mood, denied suicidal thoughts, showed  improved symptoms and stabilization.  There were no other acute risk factors that could be mitigated by further inpatient hospitalization.   PROCEDURES AND TREATMENT:  >Individual psychotherapy/CBT modality and group therapy  >Milieu therapy and supportive therapy  >Psychopharmacologic management.  >Motivational counseling.   Patient labs were all WNL. Labs include---CBC/Chemistry/LFT/A1C/Lipid. Panel/TSH/CPK/HBA1-C/U-A/U-Tox/EKG/COVID/HCG  EKG: NSR   QTC: 418ms  Covid at discharge: nondetectable   Consultation: NA  Medical Issues: NA  Imaging: NA    Medications at dc: NA    Discharge Pan:  -Patient instructed to call 911 or go to nearest ER in case of emergency.   -Patient given Suicide Prevention Lifeline number [phone] and provided instructions on its use  -Patient was given follow up appointment at [facility]

## 2024-05-08 NOTE — BH INPATIENT PSYCHIATRY DISCHARGE NOTE - NSBHASSESSSUMMFT_PSY_ALL_CORE
Patient seen individually for discharge day management. The patient's case has been reviewed with the treatment team.  Spent performing discharge risk assessment, safety planning, performing clinical eval, planning for dc and providing psychoeducation about  sx and aftercare.   On exam today the patient was cooperative with bright affect and makes fair eye contact.  Her symptoms of  depression have lessened and she reports feeling better. Her sleep patterns and appetite have improved and are better.  Denies suicidal thoughts no plan or intent. Denies HI/AVH, delusions, or other symptoms of psychotic process are reported at this time.   She showed some improvement in her symptoms. She regularly attended groups . The patient was provided with motivational counseling to  tackle stressors and enhance coping skills,  she is encouraged to abstain from illicit substances, as these can directly worsen her mood and symptoms. She has remained in good behavioral control and has not required emergent intramuscular medications or seclusion / restraints. She denied any suicidal or homicidal ideations throughout hospitalization.   Patient is able to care for self. Improved level of functioning is evident, with appropriate interaction with others. Patient able to identify protective factors, patient is future-oriented, and is motivated to continue treatment on an outpatient basis.   Patient no longer requires inpatient treatment and care. Patient is not judged to be an acute danger to self or others at this time. Appears ready and stable to be discharged to lower level of care. Discussed aftercare recommendations and patient made verbal commitment to follow up with all aftercare appointments. There are no other acute risk factors that could be mitigated by further inpatient hospitalization. Pt asks relevant questions about her discharge plan. Pt articulate a plan for living life after discharge.  Patient expressed understanding and agreed with the treatment plan. Further, instructed the patient to seek help immediately by dialing "911" or by going to the nearest ER if symptoms worsen.   She will be discharged today to home and outpatient follow up.  Patient left under no distress.         Patient seen individually for discharge day management. The patient's case has been reviewed with the treatment team.  Spent performing discharge risk assessment, safety planning, performing clinical eval, planning for dc and providing psychoeducation about  sx and aftercare.   On exam today the patient was cooperative with bright affect and makes fair eye contact.  Her symptoms of  depression have lessened and she reports feeling better. Her sleep patterns and appetite have improved and are better.  Denies suicidal thoughts no plan or intent. Denies HI/AVH, delusions, or other symptoms of psychotic process are reported at this time.   She showed some improvement in her symptoms. She regularly attended groups . The patient was provided with motivational counseling to  tackle stressors and enhance coping skills,  she is encouraged to abstain from illicit substances, as these can directly worsen her mood and symptoms. She has remained in good behavioral control and has not required emergent intramuscular medications or seclusion / restraints. She denied any suicidal or homicidal ideations throughout hospitalization.   Patient is able to care for self. Improved level of functioning is evident, with appropriate interaction with others. Patient able to identify protective factors, patient is future-oriented, and is motivated to continue treatment on an outpatient basis.   Patient no longer requires inpatient treatment and care. Patient is not judged to be an acute danger to self or others at this time. Appears ready and stable to be discharged to lower level of care. Discussed aftercare recommendations and patient made verbal commitment to follow up with all aftercare appointments. There are no other acute risk factors that could be mitigated by further inpatient hospitalization. Pt asks relevant questions about her discharge plan. Pt articulate a plan for living life after discharge.  Patient expressed understanding and agreed with the treatment plan. Further, instructed the patient to seek help immediately by dialing "911" or by going to the nearest ER if symptoms worsen.   She will be discharged today to home and outpatient follow up.  Patient left under no distress.        Discharge Progress Note Date and Time: 05-10-24 @ 0:33  Patient seen individually for discharge day management. The patient's case has been reviewed with the treatment team.  Spent performing discharge risk assessment, safety planning, performing clinical eval, planning for dc and providing psychoeducation about  sx and aftercare.   On exam today the patient was cooperative with bright affect and makes fair eye contact.  Her symptoms of  depression have lessened and she reports feeling better. Her sleep patterns and appetite have improved and are better.  Denies suicidal thoughts no plan or intent. Denies HI/AVH, delusions, or other symptoms of psychotic process are reported at this time.   She showed some improvement in her symptoms. She regularly attended groups . The patient was provided with motivational counseling to  tackle stressors and enhance coping skills,  she is encouraged to abstain from illicit substances, as these can directly worsen her mood and symptoms. She has remained in good behavioral control and has not required emergent intramuscular medications or seclusion / restraints. She denied any suicidal or homicidal ideations throughout hospitalization.   Patient is able to care for self. Improved level of functioning is evident, with appropriate interaction with others. Patient able to identify protective factors, patient is future-oriented, and is motivated to continue treatment on an outpatient basis.   Patient no longer requires inpatient treatment and care. Patient is not judged to be an acute danger to self or others at this time. Appears ready and stable to be discharged to lower level of care. Discussed aftercare recommendations and patient made verbal commitment to follow up with all aftercare appointments. There are no other acute risk factors that could be mitigated by further inpatient hospitalization. Pt asks relevant questions about her discharge plan. Pt articulate a plan for living life after discharge.  Patient expressed understanding and agreed with the treatment plan. Further, instructed the patient to seek help immediately by dialing "911" or by going to the nearest ER if symptoms worsen.   She will be discharged today to home and outpatient follow up.  Patient left under no distress.

## 2024-05-08 NOTE — BH INPATIENT PSYCHIATRY DISCHARGE NOTE - DESCRIPTION
single; has no children. 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 domiciled with 55 yr old mother - who is unemployed + 27 yr old sister (who is employed). father currently voluntarily went back to [place] - family has court case against father for "domestic violence".  Pt originally from [place].  [place] the [place] back in [place]23.   she likes listening to music, exercising/ jogging, swimming. graduated with degree in BS Communication (obtained in [employer]). no pets. no reported access to guns. is [employer].

## 2024-05-08 NOTE — BH INPATIENT PSYCHIATRY DISCHARGE NOTE - NSDCCPCAREPLAN_GEN_ALL_CORE_FT
PRINCIPAL DISCHARGE DIAGNOSIS  Diagnosis: Depression, major, single episode  Assessment and Plan of Treatment:       SECONDARY DISCHARGE DIAGNOSES  Diagnosis: Depression  Assessment and Plan of Treatment:

## 2024-05-08 NOTE — BH INPATIENT PSYCHIATRY DISCHARGE NOTE - NSBHFUPINTERVALHXFT_PSY_A_CORE
Pt was seen and evaluated. No interval events. No PRNs. Pt is eating and sleeping well. Pt reported she was feeling "good". Pt stated she was "grateful for her time on the unit". She also stated that the group sessions "really helped and she learned a lot". Pt reported that she plans on continuing with outpatient services following discharge. Denied SI or HI. Denied any pain or discomfort. VSS: temp 97, 116/59 (sitting), HR 82 (sitting), 100/66 (standing), HR 97 (standing).

## 2024-05-08 NOTE — BH INPATIENT PSYCHIATRY PROGRESS NOTE - NSBHFUPINTERVALHXFT_PSY_A_CORE
Pt was seen and evaluated. No interval events. Pt's room was changed due to an issue with her roommate. Pt reported her roommate had poor hygiene and did not respect her privacy. Pt stated she was "happy" to be in a different room and denied any current issues. Pt is eating and sleeping well. Pt reported her mood was "excited" and stated she was looking forward to being discharged. Pt stated she plans on attending therapy following discharge. She reported that her experience on the unit has made her "grateful for the little things in her life". Pt has been attending and actively participating in group and reported she finds the sessions helpful. Denied SI or HI. Pt engaging in safety plan. Denied any pain or discomfort. VSS: temp 98.3, 106/62 (sitting), HR 90 (sitting), 103/68 (standing), HR 99 (standing).  Pt was seen and evaluated. No interval events. Pt's room was changed due to an issue with her roommate. Pt reported her roommate had poor hygiene and did not respect her privacy. Pt stated she was "happy" to be in a different room and denied any current issues. Pt is eating and sleeping well. Pt reported her mood was "excited" and stated she was looking forward to being discharged. Pt stated she plans on attending therapy following discharge. She reported that her experience on the unit has made her "grateful for the little things in her life". Pt has been attending and actively participating in group and reported she finds the sessions helpful. Denied SI or HI. Pt engaging in safety plan. Denied any pain or discomfort. Pt is scheduled to be discharged on Friday 5/10. VSS: temp 98.3, 106/62 (sitting), HR 90 (sitting), 103/68 (standing), HR 99 (standing).

## 2024-05-08 NOTE — BH INPATIENT PSYCHIATRY DISCHARGE NOTE - NSBHDCHANDOFFFT_PSY_ALL_CORE
Medication list and discharge summary included discussion of hospital course, treatment, and medications, with phone number left for call back provided to outpatient Psychiatrist at …  Writer's contact information was provided for any further questions or concerns to contact Elvira Mora NP at [phone].  Medication list and discharge summary included discussion of hospital course, treatment, and medications, with phone number left for call back provided to outpatient Psychiatrist at [facility].  Writer's contact information was provided for any further questions or concerns to contact Elvira Mora NP at [phone].

## 2024-05-08 NOTE — BH INPATIENT PSYCHIATRY PROGRESS NOTE - NSBHMETABOLIC_PSY_ALL_CORE_FT
BMI:   HbA1c: A1C with Estimated Average Glucose Result: 5.3 % (05-03-24 @ 09:05)    Glucose:   BP: --Vital Signs Last 24 Hrs  T(C): 36.2 (05-07-24 @ 19:10), Max: 36.6 (05-07-24 @ 07:53)  T(F): 97.1 (05-07-24 @ 19:10), Max: 97.9 (05-07-24 @ 07:53)  HR: --  BP: --  BP(mean): --  RR: --  SpO2: --    Orthostatic VS  05-07-24 @ 19:10  Lying BP: --/-- HR: --  Sitting BP: 118/66 HR: 76  Standing BP: 121/67 HR: 83  Site: --  Mode: --  Orthostatic VS  05-07-24 @ 07:53  Lying BP: --/-- HR: --  Sitting BP: 111/65 HR: 77  Standing BP: 104/62 HR: 93  Site: --  Mode: --  Orthostatic VS  05-06-24 @ 19:53  Lying BP: --/-- HR: --  Sitting BP: 112/70 HR: 75  Standing BP: 111/70 HR: 86  Site: --  Mode: --  Orthostatic VS  05-06-24 @ 08:40  Lying BP: --/-- HR: --  Sitting BP: 90/61 HR: 80  Standing BP: 110/69 HR: 95  Site: --  Mode: --    Lipid Panel: Date/Time: 05-03-24 @ 09:09  Cholesterol, Serum: 184  LDL Cholesterol Calculated: 111  HDL Cholesterol, Serum: 64  Total Cholesterol/HDL Ration Measurement: --  Triglycerides, Serum: 43   BMI:   HbA1c: A1C with Estimated Average Glucose Result: 5.3 % (05-03-24 @ 09:05)    Glucose:   BP: --Vital Signs Last 24 Hrs  T(C): 36.8 (05-08-24 @ 08:38), Max: 36.8 (05-08-24 @ 08:38)  T(F): 98.3 (05-08-24 @ 08:38), Max: 98.3 (05-08-24 @ 08:38)  HR: --  BP: --  BP(mean): --  RR: --  SpO2: --    Orthostatic VS  05-08-24 @ 08:38  Lying BP: --/-- HR: --  Sitting BP: 106/62 HR: 90  Standing BP: 103/68 HR: 99  Site: --  Mode: --  Orthostatic VS  05-07-24 @ 19:10  Lying BP: --/-- HR: --  Sitting BP: 118/66 HR: 76  Standing BP: 121/67 HR: 83  Site: --  Mode: --  Orthostatic VS  05-07-24 @ 07:53  Lying BP: --/-- HR: --  Sitting BP: 111/65 HR: 77  Standing BP: 104/62 HR: 93  Site: --  Mode: --  Orthostatic VS  05-06-24 @ 19:53  Lying BP: --/-- HR: --  Sitting BP: 112/70 HR: 75  Standing BP: 111/70 HR: 86  Site: --  Mode: --    Lipid Panel: Date/Time: 05-03-24 @ 09:09  Cholesterol, Serum: 184  LDL Cholesterol Calculated: 111  HDL Cholesterol, Serum: 64  Total Cholesterol/HDL Ration Measurement: --  Triglycerides, Serum: 43   BMI:   HbA1c: A1C with Estimated Average Glucose Result: 5.3 % (05-03-24 @ 09:05)    Glucose:   BP: --Vital Signs Last 24 Hrs  T(C): 36.6 (05-09-24 @ 08:40), Max: 36.6 (05-09-24 @ 08:40)  T(F): 97.9 (05-09-24 @ 08:40), Max: 97.9 (05-09-24 @ 08:40)  HR: --  BP: --  BP(mean): --  RR: 16 (05-08-24 @ 20:08) (16 - 16)  SpO2: --    Orthostatic VS  05-09-24 @ 08:40  Lying BP: --/-- HR: --  Sitting BP: 109/65 HR: 79  Standing BP: 107/62 HR: 87  Site: upper left arm  Mode: electronic  Orthostatic VS  05-08-24 @ 19:13  Lying BP: --/-- HR: --  Sitting BP: 117/73 HR: 77  Standing BP: 118/70 HR: 86  Site: --  Mode: --  Orthostatic VS  05-08-24 @ 08:38  Lying BP: --/-- HR: --  Sitting BP: 106/62 HR: 90  Standing BP: 103/68 HR: 99  Site: --  Mode: --  Orthostatic VS  05-07-24 @ 19:10  Lying BP: --/-- HR: --  Sitting BP: 118/66 HR: 76  Standing BP: 121/67 HR: 83  Site: --  Mode: --    Lipid Panel: Date/Time: 05-03-24 @ 09:09  Cholesterol, Serum: 184  LDL Cholesterol Calculated: 111  HDL Cholesterol, Serum: 64  Total Cholesterol/HDL Ration Measurement: --  Triglycerides, Serum: 43

## 2024-05-08 NOTE — BH INPATIENT PSYCHIATRY PROGRESS NOTE - NSBHCHARTREVIEWVS_PSY_A_CORE FT
Vital Signs Last 24 Hrs  T(C): 36.2 (05-07-24 @ 19:10), Max: 36.6 (05-07-24 @ 07:53)  T(F): 97.1 (05-07-24 @ 19:10), Max: 97.9 (05-07-24 @ 07:53)  HR: --  BP: --  BP(mean): --  RR: --  SpO2: --    Orthostatic VS  05-07-24 @ 19:10  Lying BP: --/-- HR: --  Sitting BP: 118/66 HR: 76  Standing BP: 121/67 HR: 83  Site: --  Mode: --  Orthostatic VS  05-07-24 @ 07:53  Lying BP: --/-- HR: --  Sitting BP: 111/65 HR: 77  Standing BP: 104/62 HR: 93  Site: --  Mode: --  Orthostatic VS  05-06-24 @ 19:53  Lying BP: --/-- HR: --  Sitting BP: 112/70 HR: 75  Standing BP: 111/70 HR: 86  Site: --  Mode: --  Orthostatic VS  05-06-24 @ 08:40  Lying BP: --/-- HR: --  Sitting BP: 90/61 HR: 80  Standing BP: 110/69 HR: 95  Site: --  Mode: --   Vital Signs Last 24 Hrs  T(C): 36.8 (05-08-24 @ 08:38), Max: 36.8 (05-08-24 @ 08:38)  T(F): 98.3 (05-08-24 @ 08:38), Max: 98.3 (05-08-24 @ 08:38)  HR: --  BP: --  BP(mean): --  RR: --  SpO2: --    Orthostatic VS  05-08-24 @ 08:38  Lying BP: --/-- HR: --  Sitting BP: 106/62 HR: 90  Standing BP: 103/68 HR: 99  Site: --  Mode: --  Orthostatic VS  05-07-24 @ 19:10  Lying BP: --/-- HR: --  Sitting BP: 118/66 HR: 76  Standing BP: 121/67 HR: 83  Site: --  Mode: --  Orthostatic VS  05-07-24 @ 07:53  Lying BP: --/-- HR: --  Sitting BP: 111/65 HR: 77  Standing BP: 104/62 HR: 93  Site: --  Mode: --  Orthostatic VS  05-06-24 @ 19:53  Lying BP: --/-- HR: --  Sitting BP: 112/70 HR: 75  Standing BP: 111/70 HR: 86  Site: --  Mode: --   Vital Signs Last 24 Hrs  T(C): 36.6 (05-09-24 @ 08:40), Max: 36.6 (05-09-24 @ 08:40)  T(F): 97.9 (05-09-24 @ 08:40), Max: 97.9 (05-09-24 @ 08:40)  HR: --  BP: --  BP(mean): --  RR: 16 (05-08-24 @ 20:08) (16 - 16)  SpO2: --    Orthostatic VS  05-09-24 @ 08:40  Lying BP: --/-- HR: --  Sitting BP: 109/65 HR: 79  Standing BP: 107/62 HR: 87  Site: upper left arm  Mode: electronic  Orthostatic VS  05-08-24 @ 19:13  Lying BP: --/-- HR: --  Sitting BP: 117/73 HR: 77  Standing BP: 118/70 HR: 86  Site: --  Mode: --  Orthostatic VS  05-08-24 @ 08:38  Lying BP: --/-- HR: --  Sitting BP: 106/62 HR: 90  Standing BP: 103/68 HR: 99  Site: --  Mode: --  Orthostatic VS  05-07-24 @ 19:10  Lying BP: --/-- HR: --  Sitting BP: 118/66 HR: 76  Standing BP: 121/67 HR: 83  Site: --  Mode: --

## 2024-05-09 PROCEDURE — 99232 SBSQ HOSP IP/OBS MODERATE 35: CPT

## 2024-05-09 NOTE — BH INPATIENT PSYCHIATRY PROGRESS NOTE - NSICDXBHSECONDARYDX_PSY_ALL_CORE
Anxiety disorder   F41.9  

## 2024-05-09 NOTE — BH INPATIENT PSYCHIATRY PROGRESS NOTE - NSBHMSESPEECH_PSY_A_CORE
Abnormal as indicated, otherwise normal...
Normal volume, rate, productivity, spontaneity and articulation
Normal volume, rate, productivity, spontaneity and articulation

## 2024-05-09 NOTE — BH INPATIENT PSYCHIATRY PROGRESS NOTE - NSBHATTESTBILLING_PSY_A_CORE
45560-Oxybufhtuo OBS or IP - moderate complexity OR 35-49 mins
38642-Zcdmzoqnfe OBS or IP - moderate complexity OR 35-49 mins
87431-Dltvqicabj OBS or IP - moderate complexity OR 35-49 mins
64914-Bfzoaivtix OBS or IP - moderate complexity OR 35-49 mins
45488-Wrwhkifdoe OBS or IP - moderate complexity OR 35-49 mins
29350-Gcxqrwpvzd OBS or IP - moderate complexity OR 35-49 mins

## 2024-05-09 NOTE — BH INPATIENT PSYCHIATRY PROGRESS NOTE - NSTXDCOTHRGOAL_PSY_ALL_CORE
Pt will show a reduction of symptoms and engage meaningfully with writer to identify a safe discharge plan. Pt will comply with recommended tx plan and medications for 5 days, identify 2 benefits for adhering to tx.

## 2024-05-09 NOTE — BH INPATIENT PSYCHIATRY PROGRESS NOTE - NSBHMSEBEHAV_PSY_A_CORE
Ambulated patient with 1 assist around nurses station. Patient stated she was not dizzy. Patient was wobbly, unsteady, and miss stepped numerous times. Provider did witness ambulation   
Cooperative/Other
Cooperative/Other
Cooperative
Cooperative/Other
Cooperative/Other
Cooperative

## 2024-05-09 NOTE — BH INPATIENT PSYCHIATRY PROGRESS NOTE - NSDCCRITERIA_PSY_ALL_CORE
Symptom Stabilization  Optimize medications  CGI<=3  Outpatient services f/u  
Symptom Stabilization  Optimize medications  CGI<=3  Outpatient services f/u  
Symptom Stabilization  CGI<=3  Outpatient services f/u  
Symptom Stabilization  Optimize medications  CGI<=3  Outpatient services f/u  

## 2024-05-09 NOTE — BH INPATIENT PSYCHIATRY PROGRESS NOTE - NSBHFUPINTERVALCCFT_PSY_A_CORE
Pt seen f/u for depression and anxiety

## 2024-05-09 NOTE — BH INPATIENT PSYCHIATRY PROGRESS NOTE - NSICDXBHPRIMARYDX_PSY_ALL_CORE
Major depression, single episode   F32.9  

## 2024-05-09 NOTE — BH INPATIENT PSYCHIATRY PROGRESS NOTE - CURRENT MEDICATION
MEDICATIONS  (STANDING):    MEDICATIONS  (PRN):  hydrOXYzine hydrochloride 25 milliGRAM(s) Oral every 6 hours PRN Anxiety/ sleep disturbances  LORazepam     Tablet 1 milliGRAM(s) Oral every 6 hours PRN agitation/ severe anxiety  LORazepam   Injectable 2 milliGRAM(s) IntraMuscular Once PRN severe agitation  melatonin. 3 milliGRAM(s) Oral at bedtime PRN Insomnia   MEDICATIONS  (STANDING):    MEDICATIONS  (PRN):  hydrOXYzine hydrochloride 25 milliGRAM(s) Oral every 6 hours PRN Anxiety/ sleep disturbances  melatonin. 3 milliGRAM(s) Oral at bedtime PRN Insomnia

## 2024-05-09 NOTE — BH PSYCHOLOGY - CLINICIAN PSYCHOTHERAPY NOTE - NSBHPSYCHOLINT_PSY_A_CORE FT
Acceptance and Commitment Therapy

## 2024-05-09 NOTE — BH INPATIENT PSYCHIATRY PROGRESS NOTE - ATTENDING COMMENTS
Chart was reviewed and case discussed with the Psych NP. I agree with the assessment and plan as documented in the Psych NP's progress note and was directly involved in medical decision making.   
Chart was reviewed and case discussed with the Psych NP. I agree with the assessment and plan as documented in the Psych NP's progress note and was directly involved in medical decision making.   
Chart was reviewed and case discussed with the Psych NP. I agree with the assessment and plan as documented in the Psych NP's progress note and was directly involved in medical decision making.   Pt reports feeling brighter, denies acute depressed mood, denies SI/SP, highly future oriented, motivated to reengage in outpatient therapy upon discharge.

## 2024-05-09 NOTE — BH INPATIENT PSYCHIATRY PROGRESS NOTE - NSBHMSEKNOWHOW_PSY_ALL_CORE
Current Events/Vocabulary
Current Events/Vocabulary
Current Events

## 2024-05-09 NOTE — BH INPATIENT PSYCHIATRY PROGRESS NOTE - NSBHMSEMUSCLE_PSY_A_CORE
Unable to assess
Normal muscle tone/strength
Unable to assess
Normal muscle tone/strength

## 2024-05-09 NOTE — BH PSYCHOLOGY - CLINICIAN PSYCHOTHERAPY NOTE - NSBHPSYCHOLGOALS_PSY_A_CORE
Decrease symptoms/Assessment/Improve social/vocational/coping skills

## 2024-05-09 NOTE — BH INPATIENT PSYCHIATRY PROGRESS NOTE - NSBHMETABOLIC_PSY_ALL_CORE_FT
BMI:   HbA1c: A1C with Estimated Average Glucose Result: 5.3 % (05-03-24 @ 09:05)    Glucose:   BP: --Vital Signs Last 24 Hrs  T(C): 35.8 (05-08-24 @ 19:13), Max: 36.8 (05-08-24 @ 08:38)  T(F): 96.5 (05-08-24 @ 19:13), Max: 98.3 (05-08-24 @ 08:38)  HR: --  BP: --  BP(mean): --  RR: 16 (05-08-24 @ 20:08) (16 - 16)  SpO2: --    Orthostatic VS  05-08-24 @ 19:13  Lying BP: --/-- HR: --  Sitting BP: 117/73 HR: 77  Standing BP: 118/70 HR: 86  Site: --  Mode: --  Orthostatic VS  05-08-24 @ 08:38  Lying BP: --/-- HR: --  Sitting BP: 106/62 HR: 90  Standing BP: 103/68 HR: 99  Site: --  Mode: --  Orthostatic VS  05-07-24 @ 19:10  Lying BP: --/-- HR: --  Sitting BP: 118/66 HR: 76  Standing BP: 121/67 HR: 83  Site: --  Mode: --  Orthostatic VS  05-07-24 @ 07:53  Lying BP: --/-- HR: --  Sitting BP: 111/65 HR: 77  Standing BP: 104/62 HR: 93  Site: --  Mode: --    Lipid Panel: Date/Time: 05-03-24 @ 09:09  Cholesterol, Serum: 184  LDL Cholesterol Calculated: 111  HDL Cholesterol, Serum: 64  Total Cholesterol/HDL Ration Measurement: --  Triglycerides, Serum: 43   BMI:   HbA1c: A1C with Estimated Average Glucose Result: 5.3 % (05-03-24 @ 09:05)    Glucose:   BP: --Vital Signs Last 24 Hrs  T(C): 36.6 (05-09-24 @ 08:40), Max: 36.6 (05-09-24 @ 08:40)  T(F): 97.9 (05-09-24 @ 08:40), Max: 97.9 (05-09-24 @ 08:40)  HR: --  BP: --  BP(mean): --  RR: 16 (05-08-24 @ 20:08) (16 - 16)  SpO2: --    Orthostatic VS  05-09-24 @ 08:40  Lying BP: --/-- HR: --  Sitting BP: 109/65 HR: 79  Standing BP: 107/62 HR: 87  Site: upper left arm  Mode: electronic  Orthostatic VS  05-08-24 @ 19:13  Lying BP: --/-- HR: --  Sitting BP: 117/73 HR: 77  Standing BP: 118/70 HR: 86  Site: --  Mode: --  Orthostatic VS  05-08-24 @ 08:38  Lying BP: --/-- HR: --  Sitting BP: 106/62 HR: 90  Standing BP: 103/68 HR: 99  Site: --  Mode: --  Orthostatic VS  05-07-24 @ 19:10  Lying BP: --/-- HR: --  Sitting BP: 118/66 HR: 76  Standing BP: 121/67 HR: 83  Site: --  Mode: --    Lipid Panel: Date/Time: 05-03-24 @ 09:09  Cholesterol, Serum: 184  LDL Cholesterol Calculated: 111  HDL Cholesterol, Serum: 64  Total Cholesterol/HDL Ration Measurement: --  Triglycerides, Serum: 43   BMI:   HbA1c: A1C with Estimated Average Glucose Result: 5.3 % (05-03-24 @ 09:05)    Glucose:   BP: --Vital Signs Last 24 Hrs  T(C): 36.1 (05-10-24 @ 07:39), Max: 36.8 (05-09-24 @ 19:16)  T(F): 97 (05-10-24 @ 07:39), Max: 98.2 (05-09-24 @ 19:16)  HR: --  BP: --  BP(mean): --  RR: 16 (05-09-24 @ 20:22) (16 - 16)  SpO2: --    Orthostatic VS  05-10-24 @ 07:39  Lying BP: --/-- HR: --  Sitting BP: 116/59 HR: 82  Standing BP: 100/66 HR: 97  Site: --  Mode: --  Orthostatic VS  05-09-24 @ 19:16  Lying BP: --/-- HR: --  Sitting BP: 117/73 HR: 67  Standing BP: 112/69 HR: 74  Site: --  Mode: --  Orthostatic VS  05-09-24 @ 08:40  Lying BP: --/-- HR: --  Sitting BP: 109/65 HR: 79  Standing BP: 107/62 HR: 87  Site: upper left arm  Mode: electronic  Orthostatic VS  05-08-24 @ 19:13  Lying BP: --/-- HR: --  Sitting BP: 117/73 HR: 77  Standing BP: 118/70 HR: 86  Site: --  Mode: --    Lipid Panel: Date/Time: 05-03-24 @ 09:09  Cholesterol, Serum: 184  LDL Cholesterol Calculated: 111  HDL Cholesterol, Serum: 64  Total Cholesterol/HDL Ration Measurement: --  Triglycerides, Serum: 43

## 2024-05-09 NOTE — BH PSYCHOLOGY - CLINICIAN PSYCHOTHERAPY NOTE - TOKEN PULL-DIAGNOSIS
Primary Diagnosis:  Major depression, single episode [F32.9]        Problem Dx:   Anxiety disorder [F41.9]      Depression [F32.A]      

## 2024-05-09 NOTE — BH INPATIENT PSYCHIATRY PROGRESS NOTE - NSBHATTESTTYPEVISIT_PSY_A_CORE
RANDY without on-site Attending supervision
On-site Attending supervising RANDY (99XXX codes)
Attending Only
On-site Attending supervising RANDY (99XXX codes)
RANDY without on-site Attending supervision
On-site Attending supervising RANDY (99XXX codes)

## 2024-05-09 NOTE — BH INPATIENT PSYCHIATRY PROGRESS NOTE - NSBHFUPINTERVALHXFT_PSY_A_CORE
Pt was seen and evaluated. No interval events or PRNs. Pt is eating and sleeping well. Pt reported her mood was "ok" and denied any current issues. Denied SI or HI. Pt engaging in safety plan. Denied any pain or discomfort. Pt is scheduled to be discharged tomorrow, 5/10. VSS: temp 97.9, 109/65 (sitting), HR 79 (sitting), 107/62 (standing), HR 87 (standing).  Pt was seen and evaluated. No interval events or PRNs. Pt is eating and sleeping well.  Patient is observed in group therapy this morning.  Pt reported her mood was "ok" reports feeling "excited about going home tomorrow"  pt reports she is looking forward to seeing her family and boyfriend.  Pt is future oriented and denied any current issues. Denied SI/HI no plan or intent. No AVH. Discussed dc plans for tomorrow, all questions answered. Reviewed  safety plan and outpatient aftercare recommendations. Denied any pain or discomfort. VSS: temp 97.9, 109/65 (sitting), HR 79 (sitting), 107/62 (standing), HR 87 (standing). Continue to monitor.

## 2024-05-09 NOTE — BH INPATIENT PSYCHIATRY PROGRESS NOTE - NSBHMSEIMPULSE_PSY_A_CORE
by hx: impaired/Normal
by hx: impaired/Normal
Normal
by hx: impaired/Normal
Normal
by hx: impaired/Normal

## 2024-05-09 NOTE — BH INPATIENT PSYCHIATRY PROGRESS NOTE - NSBHASSESSSUMMFT_PSY_ALL_CORE
Patient is a  23 year old single female, no dependents.  Patient domiciles in private residence with family, currently employed.  Patient has no PPH. Patient has no prior inpatient hospitalizations.  Patient BIB EMS as a referral from [facility] for evaluation of depression + anxiety + attempted suicide (over the weekend). Patient is now hospitalized with a primary problem of emotional decompensation and SI (she had planned to buy  a gun then shooting self or possibility of overdosing on pills tylenol). However patient placed a rope around her neck then strangulated self (for about 10 seconds until the rope snapped/ broke).  Patient admitted to [facility] on a 9.13 legal status. Patient requires inpatient hospitalization due to symptoms of mental illness so severe that they significantly interfere with activities of daily living, and presents a potential danger to self as a result of acute emotional decompensation with s/p SA,  recent episode of intentional self-injury. She is requiring 24-hour care at this time as a result, for psychiatric stabilization and safety.    5/8: Patient denies SI/HI, no plan or intent.  Pt seems stable, and shows readiness for dc. Pt is participating in dc planning.  DC on Friday 5/10  Plan:  >Legal: 9.13  >Obs: Routine, no current SI. no need for CO, patient not expected to pose risk to self or others in controlled inpatient setting  >Psychiatric Meds:   -pt declined medications at this time  PRN medications:  Ativan 2mg oral Q6HR PRN for agitation and anxiety.  Haldol 5mg oral Q6HR PRN for agitation.   Benadryl 50mg oral Q6HR PRN for agitation.   >Labs: Admission labs reviewed, no acute findings. U-tox negative.  Labs pending for today: A1c and Lipid panel. Hold antipsychotics if QTc >500  >Medical:  No acute concerns. No consultations needed at this time. No indication for CIWA. Patient with consistently stable VS, no visible physical symptoms of withdrawal.   During the course of treatment, will collaborate with medical team to manage medical issues.  >Diet: Regular  >Social: milieu/structured therapy  >Treatment Interventions: Groups and Individual Therapy/CBT, Motivational counseling for substance abuse related issues.   >Dispo: Collateral and dispo planning pending further symptom and medication optimization. DC on 5/10

## 2024-05-09 NOTE — BH INPATIENT PSYCHIATRY PROGRESS NOTE - NSBHCHARTREVIEWVS_PSY_A_CORE FT
Vital Signs Last 24 Hrs  T(C): 35.8 (05-08-24 @ 19:13), Max: 36.8 (05-08-24 @ 08:38)  T(F): 96.5 (05-08-24 @ 19:13), Max: 98.3 (05-08-24 @ 08:38)  HR: --  BP: --  BP(mean): --  RR: 16 (05-08-24 @ 20:08) (16 - 16)  SpO2: --    Orthostatic VS  05-08-24 @ 19:13  Lying BP: --/-- HR: --  Sitting BP: 117/73 HR: 77  Standing BP: 118/70 HR: 86  Site: --  Mode: --  Orthostatic VS  05-08-24 @ 08:38  Lying BP: --/-- HR: --  Sitting BP: 106/62 HR: 90  Standing BP: 103/68 HR: 99  Site: --  Mode: --  Orthostatic VS  05-07-24 @ 19:10  Lying BP: --/-- HR: --  Sitting BP: 118/66 HR: 76  Standing BP: 121/67 HR: 83  Site: --  Mode: --  Orthostatic VS  05-07-24 @ 07:53  Lying BP: --/-- HR: --  Sitting BP: 111/65 HR: 77  Standing BP: 104/62 HR: 93  Site: --  Mode: --   Vital Signs Last 24 Hrs  T(C): 36.6 (05-09-24 @ 08:40), Max: 36.6 (05-09-24 @ 08:40)  T(F): 97.9 (05-09-24 @ 08:40), Max: 97.9 (05-09-24 @ 08:40)  HR: --  BP: --  BP(mean): --  RR: 16 (05-08-24 @ 20:08) (16 - 16)  SpO2: --    Orthostatic VS  05-09-24 @ 08:40  Lying BP: --/-- HR: --  Sitting BP: 109/65 HR: 79  Standing BP: 107/62 HR: 87  Site: upper left arm  Mode: electronic  Orthostatic VS  05-08-24 @ 19:13  Lying BP: --/-- HR: --  Sitting BP: 117/73 HR: 77  Standing BP: 118/70 HR: 86  Site: --  Mode: --  Orthostatic VS  05-08-24 @ 08:38  Lying BP: --/-- HR: --  Sitting BP: 106/62 HR: 90  Standing BP: 103/68 HR: 99  Site: --  Mode: --  Orthostatic VS  05-07-24 @ 19:10  Lying BP: --/-- HR: --  Sitting BP: 118/66 HR: 76  Standing BP: 121/67 HR: 83  Site: --  Mode: --   Vital Signs Last 24 Hrs  T(C): 36.1 (05-10-24 @ 07:39), Max: 36.8 (05-09-24 @ 19:16)  T(F): 97 (05-10-24 @ 07:39), Max: 98.2 (05-09-24 @ 19:16)  HR: --  BP: --  BP(mean): --  RR: 16 (05-09-24 @ 20:22) (16 - 16)  SpO2: --    Orthostatic VS  05-10-24 @ 07:39  Lying BP: --/-- HR: --  Sitting BP: 116/59 HR: 82  Standing BP: 100/66 HR: 97  Site: --  Mode: --  Orthostatic VS  05-09-24 @ 19:16  Lying BP: --/-- HR: --  Sitting BP: 117/73 HR: 67  Standing BP: 112/69 HR: 74  Site: --  Mode: --  Orthostatic VS  05-09-24 @ 08:40  Lying BP: --/-- HR: --  Sitting BP: 109/65 HR: 79  Standing BP: 107/62 HR: 87  Site: upper left arm  Mode: electronic  Orthostatic VS  05-08-24 @ 19:13  Lying BP: --/-- HR: --  Sitting BP: 117/73 HR: 77  Standing BP: 118/70 HR: 86  Site: --  Mode: --

## 2024-05-09 NOTE — BH PSYCHOLOGY - CLINICIAN PSYCHOTHERAPY NOTE - NSBHPSYCHOLINT_PSY_A_CORE
Cognitive/behavioral therapy/other...

## 2024-05-09 NOTE — BH PSYCHOLOGY - CLINICIAN PSYCHOTHERAPY NOTE - NSBHPSYCHOLNARRATIVE_PSY_A_CORE FT
Pt was alert and presented with adequate hygiene and grooming. Pt reported that she felt "okay," and is focused towards discharge. Pt reported feeling somewhat anxious, but denied experiencing symptoms of depression. She was observed to be euthymic. Pt denied experiencing A/V hallucinations. Her thoughts process was tangential, but she was easy to redirect. Pt's speech was wnl, content was relevant to the discussion. Pt denied having any thoughts of self harm. The patient also denied experiencing current suicidal ideation, plan, or intent. Pt did not endorse HI. Oriented x3. Fair insight and judgement demonstrated.    Session focused on the pt's discharge tomorrow. Pt reported reflecting on her goals and feeling "excited" to be able to work towards them. Writer explored the goal(s) the pt is most excited about and her action plan and values related to them. Pt also discussed wanting to set boundaries with those in her life adding to her stress. Writer worked with the pt to discuss what these boundaries would look like and ways she could implement them. 
Pt was alert and presented with adequate hygiene and grooming. Pt reported that she felt "okay," and is focused towards discharge. Pt reported feeling somewhat anxious, but denied experiencing symptoms of depression. She was observed to be euthymic. Pt denied experiencing A/V hallucinations. Her thoughts process was tangential, but she was easy to redirect. Pt's speech was wnl, content was relevant to the discussion. Pt denied having any thoughts of self harm. The patient also denied experiencing current suicidal ideation, plan, or intent. Pt did not endorse HI. Oriented x3. Fair insight and judgement demonstrated.    Session focused on introductions, writer introduced herself and spoke about the role of psychology. Limits of confidentiality and parameters of treatment were discussed as well. Pt reported that she was open to meeting one-on-one and provided a brief vague history of her presenting concerns and symptoms. Pt spoke briefly about her previous suicidal ideation, but did not wish to provide much detail surrounding what led up to this event. Pt expressed regret for these thoughts and reported she is more focused towards her future. Writer spoke to the pt about her values and goals. Pt reported that she would like to learn how to create a "happy life" once discharged which the writer explored with her. 
Pt was alert and presented with adequate hygiene and grooming. Pt reported that she felt "okay," and is focused towards discharge. Pt reported feeling somewhat anxious, but denied experiencing symptoms of depression. She was observed to be euthymic. Pt denied experiencing A/V hallucinations. Her thoughts process was tangential, but she was easy to redirect. Pt's speech was wnl, content was relevant to the discussion. Pt denied having any thoughts of self harm. The patient also denied experiencing current suicidal ideation, plan, or intent. Pt did not endorse HI. Oriented x3. Fair insight and judgement demonstrated.    Session focused on the pt's upcoming discharge. Pt spoke about her values and struggling to focus on her goals before she came to the unit. Writer spoke to the pt about her values and goals as well as the stressors in her life. Pt spoke about her relationship with her sister and father and her worries about her future. Writer spoke to the pt about ways she could cope with her concerns and still work towards her goals.

## 2024-05-09 NOTE — BH INPATIENT PSYCHIATRY PROGRESS NOTE - ADDITIONAL DETAILS / COMMENTS
no ligature marks noted on neck region

## 2024-05-10 VITALS — TEMPERATURE: 97 F

## 2024-05-10 NOTE — BH PSYCHOLOGY - GROUP THERAPY NOTE - NSPSYCHOLGRPCOGPT_PSY_A_CORE FT
Patient attended Cognitive Behavioral Therapy Group utilizing concepts and skills from Acceptance and Commitment Therapy (ACT). The group started with a brief check in/mindfulness exercise. The group then focused on the topic of myths about emotions. Patients shared times in which they've heard many of these myths and how it has negatively impacted them. Pts also shared times where they focused on validating their emotional experiences and the positive impact it had on them. The  explained concepts, reinforced participation, and engaged patients in the discussion. 
Patient attended Cognitive Behavioral Therapy Group utilizing concepts and skills from Acceptance and Commitment Therapy (ACT). The group started with a brief check in where patients were asked to share something they are grateful for. The group then focused on the topics of self care. Patients discussed how they want to incorporate more enjoyable moments in their days. Patients spoke about their values and how they can incorporate their values into their life. The  explained concepts, reinforced participation, and engaged patients in the discussion. 
Patient attended Cognitive Behavioral Therapy Group utilizing concepts and skills from Acceptance and Commitment Therapy (ACT). The group started with a brief check in/mindfulness exercise. The group then focused on the topic of radical acceptance. Patients shared their struggles to accept challenges that occur in their lives. Patients also spoke about ways they are working towards acceptance of their situation and aspects in/out of their control. The  explained concepts, reinforced participation, and engaged patients in the discussion. 
Patient attended Cognitive Behavioral Therapy Group utilizing concepts and skills from Acceptance and Commitment Therapy (ACT). The group started with a brief check in where patients were asked to share what motivates them. The group then focused on the topic distress tolerance. Patients shared what coping strategies they have used, and what ones they would like to try. Patients also spoke about ways they are able to stay present. The  and co leader explained concepts, reinforced participation, and engaged patients in the discussion. 
Patient attended Cognitive Behavioral Therapy Group utilizing concepts and skills from Acceptance and Commitment Therapy (ACT). The group started with a brief check in/mindfulness exercise. The group then focused on the topic acceptance of emotions. Patients shared times in which they struggled to accept their emotions. Pts also spoke about times when they accepted their emotions and focused on positive ways to cope. The  explained concepts, reinforced participation, and engaged patients in the discussion.

## 2024-05-10 NOTE — BH PSYCHOLOGY - GROUP THERAPY NOTE - NSBHPSYCHOLRESPCOMMENT_PSY_A_CORE FT
Patient discussed that music motivates her 
Patient discussed that she values her family
Adequate: hears normal conversation without difficulty
Patient discussed that she is grateful for her health
Patient discussed how she wanted to leave the hospital. 
Patient discussed that she would tell her younger self to rely on others

## 2024-05-10 NOTE — BH PSYCHOLOGY - GROUP THERAPY NOTE - NSPSYCHOLGRPCOGPROB_PSY_A_CORE FT
Anxiety, Depression, Emotion dysregulation, Lack of coping skills, Psycho-social stressors, Self care, Problem solving  
Depression, Emotion dysregulation, Lack of coping skills, Psycho-social stressors, Self care, Problem solving  

## 2024-05-10 NOTE — BH DISCHARGE NOTE NURSING/SOCIAL WORK/PSYCH REHAB - MODE OF TRANSPORTATION
Enrolled with [employer] - Ordered and being shipped to patient's home address on file. ETA within 5-7 business days. 14 day holter  Received: Today  MAC Perez;  Nadara Goodell  Please order 14 day holter per Dr. Darin Geller dx: AF Ambulatory

## 2024-05-10 NOTE — BH DISCHARGE NOTE NURSING/SOCIAL WORK/PSYCH REHAB - DISCHARGE INSTRUCTIONS AFTERCARE APPOINTMENTS
In order to check the location, date, or time of your aftercare appointment, please refer to your Discharge Instructions Document given to you upon leaving the hospital.  If you have lost the instructions please call [phone]

## 2024-05-10 NOTE — BH DISCHARGE NOTE NURSING/SOCIAL WORK/PSYCH REHAB - NSDCPRGOAL_PSY_ALL_CORE
Pt made progress towards her discharge. Pt has identified coping skills such as go for fresh air, talk to her close friend/family, text her family/friend, listen to music, walk away, and set boundaries with others to manage symptoms. Pt has verbalized her struggle of not having personal space because she shared a room with her mother and sister. Writer discussed the possibility of go to walk early for 15 to 30 minutes, therefore she can have time for herself. Pt was receptive. Writer also discussed the benefits of using assertive communication skills to communicate with her family when her family started to make negative comments. Pt was receptive. Pt currently denies SI, HI, AH, and VH. During this hospitalization, pt attended all groups. During the group session, pt was able to tolerate group structure, actively participated with relevant feedback. Pt was visible on the unit, interacts well with others. Pt showed fair ADLs. PT has participated in her safety plan.

## 2024-05-10 NOTE — BH PSYCHOLOGY - GROUP THERAPY NOTE - NSPSYCHOLGRPCOGGOAL_PSY_A_CORE FT
Decrease symptoms, Develop coping/emotion regulation skills, Psychoeducation

## 2024-05-10 NOTE — BH DISCHARGE NOTE NURSING/SOCIAL WORK/PSYCH REHAB - PATIENT PORTAL LINK FT
You can access the FollowMyHealth Patient Portal offered by [facility] by registering at the following website: [email or web address]. By joining [facility]’s FollowMyHealth portal, you will also be able to view your health information using other applications (apps) compatible with our system.

## 2024-05-10 NOTE — BH DISCHARGE NOTE NURSING/SOCIAL WORK/PSYCH REHAB - NSDCPRRECOMMEND_PSY_ALL_CORE
Please follow up with your assigned appointment.  Please follow up with your assigned appointment with [facility].

## 2024-05-10 NOTE — BH PSYCHOLOGY - GROUP THERAPY NOTE - NSBHPSYCHOLPARTICIPCOMMENT_PSY_A_CORE FT
Patient was actively engaged in group discussion 
Patient answered questions when asked and read from worksheet 
Patient was actively engaged in group discussion 
Patient answered questions when asked and read from worksheet 
Patient answered questions when asked and read from worksheet

## 2024-05-10 NOTE — BH DISCHARGE NOTE NURSING/SOCIAL WORK/PSYCH REHAB - NSCDUDCCRISIS_PSY_A_CORE
[facility]  1 [phone]) [facility] ([phone])  Text "WELL" to [phone]  Website: www.[employer]/.[employer] [facility] 1 (042) 621-HOPE (7480) Website: www.safehorizon.org/.National Suicide Prevention Lifeline 7 (757) 870-1067/.  [facility]  1 (991) LIF[facility] (042-3442)/.  [facility]  (157) 708-4121/.  [facility]  [address] [place], [place] [address] [phone]   Hours:  Monday through Friday from 9 AM to 3 PM/988 Suicide and Crisis Lifeline

## 2024-05-10 NOTE — BH PSYCHOLOGY - GROUP THERAPY NOTE - NSBHPSYCHOLASSESSPROV_PSY_A_CORE
Psychology Trainee only
car

## 2025-01-31 NOTE — BH INPATIENT PSYCHIATRY ASSESSMENT NOTE - NSSUIIDEDETER_PSY_ALL_CORE
Patient is requesting a Rx refill on her Furosemide 40 mg.    Patient stated that she is all out of  that medication and she only have 3 left in the following medications    Aspirin 81 mg chewable tablets  Atorvastatin 40 mg  Clopidogrel 75 mg  Entresto 24-26 mg  Metoproplol tartrate 25 mg  Spironolactone 25mg  Vitamin D 25mg    
(5) Deterrents definitely did not stop you